# Patient Record
Sex: FEMALE | Race: WHITE | URBAN - METROPOLITAN AREA
[De-identification: names, ages, dates, MRNs, and addresses within clinical notes are randomized per-mention and may not be internally consistent; named-entity substitution may affect disease eponyms.]

---

## 2018-02-28 ENCOUNTER — OFFICE VISIT (OUTPATIENT)
Dept: FAMILY MEDICINE CLINIC | Age: 78
End: 2018-02-28

## 2018-02-28 VITALS
HEART RATE: 81 BPM | TEMPERATURE: 98.4 F | OXYGEN SATURATION: 95 % | HEIGHT: 63 IN | SYSTOLIC BLOOD PRESSURE: 122 MMHG | BODY MASS INDEX: 26.58 KG/M2 | WEIGHT: 150 LBS | DIASTOLIC BLOOD PRESSURE: 64 MMHG | RESPIRATION RATE: 20 BRPM

## 2018-02-28 DIAGNOSIS — Z00.00 MEDICARE ANNUAL WELLNESS VISIT, SUBSEQUENT: ICD-10-CM

## 2018-02-28 DIAGNOSIS — R73.03 PREDIABETES: ICD-10-CM

## 2018-02-28 DIAGNOSIS — F41.9 ANXIETY AND DEPRESSION: Primary | ICD-10-CM

## 2018-02-28 DIAGNOSIS — F32.A ANXIETY AND DEPRESSION: Primary | ICD-10-CM

## 2018-02-28 RX ORDER — VENLAFAXINE HYDROCHLORIDE 150 MG/1
150 CAPSULE, EXTENDED RELEASE ORAL DAILY
Qty: 30 CAP | Refills: 1 | Status: SHIPPED | OUTPATIENT
Start: 2018-02-28 | End: 2018-03-28 | Stop reason: SDUPTHER

## 2018-02-28 RX ORDER — BUPROPION HYDROCHLORIDE 150 MG/1
TABLET, EXTENDED RELEASE ORAL 2 TIMES DAILY
COMMUNITY
End: 2018-02-28 | Stop reason: ALTCHOICE

## 2018-02-28 RX ORDER — ASCORBIC ACID 500 MG
TABLET ORAL
COMMUNITY
End: 2018-05-30 | Stop reason: ALTCHOICE

## 2018-02-28 NOTE — PROGRESS NOTES
Assessment/Plan:    1. Anxiety and depression  -stop wellbutrin. incr dose effexor. F/u in 1mo. Consider changing xanax to buspar.  - venlafaxine-SR (EFFEXOR-XR) 150 mg capsule; Take 1 Cap by mouth daily. Indications: ANXIETY WITH DEPRESSION  Dispense: 30 Cap; Refill: 1    2. Medicare annual wellness visit, subsequent  - METABOLIC PANEL, COMPREHENSIVE; Future  - LIPID PANEL; Future  - CBC W/O DIFF; Future    3. Prediabetes  - HEMOGLOBIN A1C W/O EAG; Future    The plan was discussed with the patient. The patient verbalized understanding and is in agreement with the plan. All medication potential side effects were discussed with the patient. Health Maintenance:   Health Maintenance   Topic Date Due    Pneumococcal 65+ Low/Medium Risk (1 of 2 - PCV13) 09/18/2006    GLAUCOMA SCREENING Q2Y  01/01/2019    MEDICARE YEARLY EXAM  03/01/2019    DTaP/Tdap/Td series (2 - Td) 02/28/2028    OSTEOPOROSIS SCREENING (DEXA)  Addressed    ZOSTER VACCINE AGE 60>  Completed    Influenza Age 5 to Adult  Addressed       Julia Taylor is a 69 y.o. female and presents with Annual Wellness Visit     Subjective:  Hasn't been seen for 2 years b/c she was being seen by Dr. Yumiko [place]ana (but moved practice and is under investigation). Pt is going through a lot of stress right now. She is planning on moving to [place] soon. She feels anxious and down. PHQ 9 score indicates moderate depression, despite meds. She sleeps well on trazodone. No SI/HI. She needs xanax almost daily b/c of anxiety. ROS:  Constitutional: No recent weight change. No weakness/fatigue. No f/c. Skin: No rashes, change in nails/hair, itching   HENT: No HA, dizziness. No hearing loss/tinnitus. No nasal congestion/discharge. Eyes: No change in vision, double/blurred vision or eye pain/redness. Cardiovascular: No CP/palpitations. No CONN/orthopnea/PND. Respiratory: No cough/sputum, dyspnea, wheezing.    Gastointestinal: No dysphagia, reflux. No n/v. No constipation/diarrhea. No melena/rectal bleeding. Genitourinary: No dysuria, urinary hesitancy, nocturia, hematuria. No incontinence. Musculoskeletal: No joint pain/stiffness. No muscle pain/tenderness. Endo: No heat/cold intolerance, no polyuria/polydypsia. Heme: No h/o anemia. No easy bleeding/bruising. Allergy/Immunology: No seasonal rhinitis. Denies frequent colds, sinus/ear infections. Neurological: No seizures/numbness/weakness. No paresthesias. Psychiatric:  +depression, +anxiety. The problem list was updated as a part of today's visit. Patient Active Problem List   Diagnosis Code    Allergic rhinitis J30.9    Colon polyps K63.5    Panic disorder F41.0    Other and unspecified hyperlipidemia E78.5    Prediabetes R73.03    Neuropathy, leg G57.90    Fear of crowded places F40.00    Advance care planning Z71.89    DNR (do not resuscitate) Z66       The PSH, FH were reviewed. SH:  Social History   Substance Use Topics    Smoking status: Former Smoker     Quit date: 1/1/2007    Smokeless tobacco: Former User      Comment: 2007    Alcohol use 0.5 - 2.0 oz/week     1 - 4 Standard drinks or equivalent per week      Comment: 1-4/wk       Medications/Allergies:  Current Outpatient Prescriptions on File Prior to Visit   Medication Sig Dispense Refill    venlafaxine-SR (EFFEXOR-XR) 75 mg capsule Take 1 Cap by mouth daily. 90 Cap 0    montelukast (SINGULAIR) 10 mg tablet Take 1 Tab by mouth daily. Indications: ALLERGIC RHINITIS 30 Tab 1    vitamin e (E GEMS) 1,000 unit capsule Take 1,000 Units by mouth daily.  ALPRAZolam (XANAX) 0.25 mg tablet Take 1 Tab by mouth daily as needed for Anxiety. 30 Tab 1    traZODone (DESYREL) 50 mg tablet TAKE 1 TABLET NIGHTLY AS NEEDED FOR SLEEP 90 Tab 1    Cholecalciferol, Vitamin D3, 3,000 unit Tab Take  by mouth daily.  b complex vitamins tablet Take 1 Tab by mouth daily.         meloxicam (MOBIC) 15 mg tablet Take 1 Tab by mouth daily as needed for Pain. 30 Tab 0    CRESTOR 20 mg tablet TAKE 1 TABLET NIGHTLY 90 Tab 1     No current facility-administered medications on file prior to visit. No Known Allergies    Objective:  Visit Vitals    /64 (BP 1 Location: Left arm, BP Patient Position: Sitting)    Pulse 81    Temp 98.4 °F (36.9 °C) (Oral)    Resp 20    Ht 5' 3\" (1.6 m)    Wt 150 lb (68 kg)    SpO2 95%    BMI 26.57 kg/m2      Constitutional: Well developed, nourished, no distress, alert   HENT: Exterior ears and tympanic membranes normal bilaterally. Supple neck. No thyromegaly or lymphadenopathy. Oropharynx clear and moist mucous membranes. Eyes: Conjunctiva normal. PERRL. CV: S1, S2.  RRR. No murmurs/rubs. No thrills palpated. No carotid bruits. Intact distal pulses. No edema. Pulm: No abnormalities on inspection. Clear to auscultation bilaterally. No wheezing/rhonchi. Normal effort. GI: Soft, nontender, nondistended. Normal active bowel sounds. MS: Gait normal.  Joints without deformity/tenderness. Strength intact bilateral upper and lower ext. Normal ROM all extremities. Neuro: A/O x 3. No focal motor or sensory deficits. Speech normal.   Skin: No lesions/rashes on inspection. Psych: Appropriate affect, judgement and insight. Short-term memory intact.        PHQ over the last two weeks 2/28/2018   PHQ Not Done Active Diagnosis of Depression or Bipolar Disorder   Little interest or pleasure in doing things Nearly every day   Feeling down, depressed or hopeless Nearly every day   Total Score PHQ 2 6   Trouble falling or staying asleep, or sleeping too much Nearly every day   Feeling tired or having little energy Nearly every day   Poor appetite or overeating Several days   Feeling bad about yourself - or that you are a failure or have let yourself or your family down Not at all   Trouble concentrating on things such as school, work, reading or watching TV Not at all   Moving or speaking so slowly that other people could have noticed; or the opposite being so fidgety that others notice Not at all   Thoughts of being better off dead, or hurting yourself in some way Several days   PHQ 9 Score 14   How difficult have these problems made it for you to do your work, take care of your home and get along with others Somewhat difficult         This is a Subsequent Medicare Annual Wellness Exam (AWV) (Performed 12 months after IPPE or effective date of Medicare Part B enrollment)    I have reviewed the patient's medical history in detail and updated the computerized patient record. History     Past Medical History:   Diagnosis Date    Allergic rhinitis     Colon polyps     DJD (degenerative joint disease)     Hematuria     Other and unspecified hyperlipidemia     Panic disorder     Prediabetes       Past Surgical History:   Procedure Laterality Date    HX TUBAL LIGATION  1980     Current Outpatient Prescriptions   Medication Sig Dispense Refill    buPROPion SR (WELLBUTRIN SR) 150 mg SR tablet Take  by mouth two (2) times a day.  ascorbic acid, vitamin C, (VITAMIN C) 500 mg tablet Take  by mouth.  venlafaxine-SR (EFFEXOR-XR) 75 mg capsule Take 1 Cap by mouth daily. 90 Cap 0    montelukast (SINGULAIR) 10 mg tablet Take 1 Tab by mouth daily. Indications: ALLERGIC RHINITIS 30 Tab 1    vitamin e (E GEMS) 1,000 unit capsule Take 1,000 Units by mouth daily.  ALPRAZolam (XANAX) 0.25 mg tablet Take 1 Tab by mouth daily as needed for Anxiety. 30 Tab 1    traZODone (DESYREL) 50 mg tablet TAKE 1 TABLET NIGHTLY AS NEEDED FOR SLEEP 90 Tab 1    Cholecalciferol, Vitamin D3, 3,000 unit Tab Take  by mouth daily.  b complex vitamins tablet Take 1 Tab by mouth daily.  meloxicam (MOBIC) 15 mg tablet Take 1 Tab by mouth daily as needed for Pain.  30 Tab 0    CRESTOR 20 mg tablet TAKE 1 TABLET NIGHTLY 90 Tab 1     No Known Allergies  Family History Problem Relation Age of Onset    Cancer Mother      endometrial    Cancer Maternal Aunt      breast    Diabetes Paternal Uncle      Social History   Substance Use Topics    Smoking status: Former Smoker     Quit date: 1/1/2007    Smokeless tobacco: Former User      Comment: 2007    Alcohol use 0.5 - 2.0 oz/week     1 - 4 Standard drinks or equivalent per week      Comment: 1-4/wk     Patient Active Problem List   Diagnosis Code    Allergic rhinitis J30.9    Colon polyps K63.5    Panic disorder F41.0    Other and unspecified hyperlipidemia E78.5    Prediabetes R73.03    Neuropathy, leg G57.90    Fear of crowded places F40.00    Advance care planning Z71.89    DNR (do not resuscitate) Z66       Depression Risk Factor Screening:     PHQ over the last two weeks 2/28/2018   PHQ Not Done Active Diagnosis of Depression or Bipolar Disorder   Little interest or pleasure in doing things Nearly every day   Feeling down, depressed or hopeless Nearly every day   Total Score PHQ 2 6   Trouble falling or staying asleep, or sleeping too much Nearly every day   Feeling tired or having little energy Nearly every day   Poor appetite or overeating Several days   Feeling bad about yourself - or that you are a failure or have let yourself or your family down Not at all   Trouble concentrating on things such as school, work, reading or watching TV Not at all   Moving or speaking so slowly that other people could have noticed; or the opposite being so fidgety that others notice Not at all   Thoughts of being better off dead, or hurting yourself in some way Several days   PHQ 9 Score 14   How difficult have these problems made it for you to do your work, take care of your home and get along with others Somewhat difficult     Alcohol Risk Factor Screening: You do not drink alcohol or very rarely. Functional Ability and Level of Safety:   Hearing Loss  Hearing is good.     Activities of Daily Living  The home contains: no safety equipment. Patient does total self care    Fall Risk  Fall Risk Assessment, last 12 mths 2/28/2018   Able to walk? Yes   Fall in past 12 months? No       Abuse Screen  Patient is not abused    Cognitive Screening   Evaluation of Cognitive Function:  Has your family/caregiver stated any concerns about your memory: no  Normal, Mini Cog test    Patient Care Team   Patient Care Team:  Robina Agosto MD as PCP - General (Internal Medicine)    Assessment/Plan   Education and counseling provided:  Are appropriate based on today's review and evaluation  End-of-Life planning (with patient's consent)    Diagnoses and all orders for this visit:    1.  Medicare annual wellness visit, subsequent      Health Maintenance Due   Topic Date Due    Pneumococcal 65+ Low/Medium Risk (1 of 2 - PCV13) 09/18/2006

## 2018-02-28 NOTE — PROGRESS NOTES
Gwyn Antonio is a 69 y.o. female (: 1940) presenting to address:    Chief Complaint   Patient presents with    Annual Wellness Visit       Vitals:    18 1114   BP: 122/64   Pulse: 81   Resp: 20   Temp: 98.4 °F (36.9 °C)   TempSrc: Oral   SpO2: 95%   Weight: 150 lb (68 kg)   Height: 5' 3\" (1.6 m)   PainSc:   0 - No pain       Hearing/Vision:      Visual Acuity Screening    Right eye Left eye Both eyes   Without correction:      With correction: 20/25 20/25 20/20   Comments: Cataract      Learning Assessment:     Learning Assessment 3/19/2015   PRIMARY LEARNER Patient   HIGHEST LEVEL OF EDUCATION - PRIMARY LEARNER  4 YEARS OF COLLEGE   BARRIERS PRIMARY LEARNER NONE   CO-LEARNER CAREGIVER No   PRIMARY LANGUAGE ENGLISH    NEED No   LEARNER PREFERENCE PRIMARY DEMONSTRATION   ANSWERED BY Patient   RELATIONSHIP SELF     Depression Screening:     PHQ over the last two weeks 2018   PHQ Not Done Active Diagnosis of Depression or Bipolar Disorder   Little interest or pleasure in doing things -   Feeling down, depressed or hopeless -   Total Score PHQ 2 -     Fall Risk Assessment:     Fall Risk Assessment, last 12 mths 2018   Able to walk? Yes   Fall in past 12 months? No     Abuse Screening:     Abuse Screening Questionnaire 2018   Do you ever feel afraid of your partner? N   Are you in a relationship with someone who physically or mentally threatens you? N   Is it safe for you to go home? Y     Coordination of Care Questionaire:   1. Have you been to the ER, urgent care clinic since your last visit? Hospitalized since your last visit? NO    2. Have you seen or consulted any other health care providers outside of the [address] [place] [address] since your last visit? Include any pap smears or colon screening. YES. Previous PCP Dr. Kirkwood Schirmer. Internal Med. Advanced Directive:   1. Do you have an Advanced Directive? NO    2. Would you like information on Advanced Directives?  YES

## 2018-02-28 NOTE — ACP (ADVANCE CARE PLANNING)
Advance Care Planning (ACP) Provider Conversation Snapshot    Date of ACP Conversation: 02/28/18  Persons included in Conversation:  patient  Length of ACP Conversation in minutes:  <16 minutes (Non-Billable)    Authorized Decision Maker (if patient is incapable of making informed decisions): This person is:             For Patients with Decision Making Capacity:   pt is DNR. dDNR on file.   she doesn't wish for prolonged care past 2 weeks in setting of coma    Conversation Outcomes / Follow-Up Plan:   Completed new Advance Directive

## 2018-02-28 NOTE — PATIENT INSTRUCTIONS

## 2018-02-28 NOTE — MR AVS SNAPSHOT
[address] [address] [address]  [address] [facility] [id number] [phone] Patient: Jose E Linares MRN: [id number] GQE:1/05/1829 Visit Information Date & Time Provider Department Dept. Phone Encounter #  
 2/28/2018 11:15 AM Lona Davila, [address] [phone] [id number] Upcoming Health Maintenance Date Due Pneumococcal 65+ Low/Medium Risk (1 of 2 - PCV13) 9/18/2006 GLAUCOMA SCREENING Q2Y 1/1/2019 MEDICARE YEARLY EXAM 3/1/2019 DTaP/Tdap/Td series (2 - Td) 2/28/2028 Allergies as of 2/28/2018  Review Complete On: 2/28/2018 By: Siomara Decker No Known Allergies Current Immunizations  Never Reviewed No immunizations on file. Not reviewed this visit You Were Diagnosed With   
  
 Codes Comments Medicare annual wellness visit, subsequent    -  Primary ICD-10-CM: Z00.00 ICD-9-CM: V70.0 Dysthymia     ICD-10-CM: F34.1 ICD-9-CM: 300.4 Anxiety and depression     ICD-10-CM: F41.8 ICD-9-CM: 300.00, 311 Prediabetes     ICD-10-CM: R73.03 
ICD-9-CM: 790.29 Vitals BP Pulse Temp Resp Height(growth percentile) Weight(growth percentile) 122/64 (BP 1 Location: Left arm, BP Patient Position: Sitting) 81 98.4 °F (36.9 °C) (Oral) 20 5' 3\" (1.6 m) 150 lb (68 kg) SpO2 BMI OB Status Smoking Status 95% 26.57 kg/m2 Postmenopausal Former Smoker Vitals History BMI and BSA Data Body Mass Index Body Surface Area  
 26.57 kg/m 2 1.74 m 2 Preferred Pharmacy Pharmacy Name Phone [facility], [address] [place] [address] [phone] Your Updated Medication List  
  
   
This list is accurate as of 2/28/18 11:58 AM.  Always use your most recent med list.  
  
  
  
  
 ALPRAZolam 0.25 mg tablet Commonly known as:  Tyesha Pedraza Take 1 Tab by mouth daily as needed for Anxiety. b complex vitamins tablet Take 1 Tab by mouth daily. Cholecalciferol (Vitamin D3) 3,000 unit Tab Take  by mouth daily. montelukast 10 mg tablet Commonly known as:  SINGULAIR Take 1 Tab by mouth daily. Indications: ALLERGIC RHINITIS  
  
 traZODone 50 mg tablet Commonly known as:  DESYREL  
TAKE 1 TABLET NIGHTLY AS NEEDED FOR SLEEP  
  
 venlafaxine- mg capsule Commonly known as:  EFFEXOR-XR Take 1 Cap by mouth daily. Indications: ANXIETY WITH DEPRESSION  
  
 VITAMIN C 500 mg tablet Generic drug:  ascorbic acid (vitamin C) Take  by mouth.  
  
 vitamin e 1,000 unit capsule Commonly known as:  E GEMS Take 1,000 Units by mouth daily. Prescriptions Sent to Pharmacy Refills  
 venlafaxine-SR (EFFEXOR-XR) 150 mg capsule 1 Sig: Take 1 Cap by mouth daily. Indications: ANXIETY WITH DEPRESSION Class: Normal  
 Pharm[facility]: [facility] [address] [facility] #: [phone] Route: Oral  
  
Patient Instructions Medicare Wellness Visit, Female The best way to live healthy is to have a healthy lifestyle by eating a well-balanced diet, exercising regularly, limiting alcohol and stopping smoking. Regular physical exams and screening tests are another way to keep healthy. Preventive exams provided by your health care provider can find health problems before they become diseases or illnesses. Preventive services including immunizations, screening tests, monitoring and exams can help you take care of your own health. All people over age 72 should have a pneumovax  and and a prevnar shot to prevent pneumonia. These are once in a lifetime unless you and your provider decide differently. All people over 65 should have a yearly flu shot and a tetanus vaccine every 10 years. A bone mass density to screen for osteoporosis or thinning of the bones should be done every 2 years after 65. Screening for diabetes mellitus with a blood sugar test should be done every year. Glaucoma is a disease of the eye due to increased ocular pressure that can lead to blindness and it should be done every year by an eye professional. 
 
Cardiovascular screening tests that check for elevated lipids (fatty part of blood) which can lead to heart disease and strokes should be done every 5 years. Colorectal screening that evaluates for blood or polyps in your colon should be done yearly as a stool test or every five years as a flexible sigmoidoscope or every 10 years as a colonoscopy up to age 76. Breast cancer screening with a mammogram is recommended biennially  for women age 54-69. Screening for cervical cancer with a pap smear and pelvic exam is recommended for women after age 72 years every 2 years up to age 79 or when the provider and patient decide to stop. If there is a history of cervical abnormalities or other increased risk for cancer then the test is recommended yearly. Hepatitis C screening is also recommended for anyone born between 80 through [address]. A shingles vaccine is also recommended once in a lifetime after age 61. Your Medicare Wellness Exam is recommended annually. Here is a list of your current Health Maintenance items with a due date: 
Health Maintenance Due Topic Date Due  
 DTaP/Tdap/Td  (1 - Tdap) 09/18/1962  Pneumococcal Vaccine (1 of 2 - PCV13) 09/18/2006 Sujata Dickinson Annual Well Visit  02/10/2017  Flu Vaccine  08/01/2017  Glaucoma Screening   10/21/2017 [facility]! Dear Rebecca Pires: Thank you for requesting a [employer] account. Our records indicate that you already have an active [employer] account. You can access your account anytime at [email or web address] Did you know that you can access your hospital and ER discharge instructions at any time in [employer]?   You can also review all of your test results from your hospital stay or ER visit. Additional Information If you have questions, please visit the Frequently Asked Questions section of the [employer] website at [email or web address] Remember, [employer] is NOT to be used for urgent needs. For medical emergencies, dial 911. Now available from your iPhone and Android! Please provide this summary of care documentation to your next provider. Your primary care clinician is listed as Prashanth López. If you have any questions after today's visit, please call [phone].

## 2018-03-28 ENCOUNTER — OFFICE VISIT (OUTPATIENT)
Dept: FAMILY MEDICINE CLINIC | Age: 78
End: 2018-03-28

## 2018-03-28 VITALS
SYSTOLIC BLOOD PRESSURE: 120 MMHG | OXYGEN SATURATION: 97 % | HEART RATE: 100 BPM | DIASTOLIC BLOOD PRESSURE: 80 MMHG | WEIGHT: 149 LBS | RESPIRATION RATE: 20 BRPM | TEMPERATURE: 99.2 F | BODY MASS INDEX: 26.4 KG/M2 | HEIGHT: 63 IN

## 2018-03-28 DIAGNOSIS — F32.A ANXIETY AND DEPRESSION: Primary | ICD-10-CM

## 2018-03-28 DIAGNOSIS — F41.9 ANXIETY AND DEPRESSION: Primary | ICD-10-CM

## 2018-03-28 PROBLEM — F41.8 DEPRESSION WITH ANXIETY: Status: ACTIVE | Noted: 2018-03-28

## 2018-03-28 RX ORDER — VENLAFAXINE HYDROCHLORIDE 150 MG/1
150 CAPSULE, EXTENDED RELEASE ORAL DAILY
Qty: 90 CAP | Refills: 1 | Status: SHIPPED | OUTPATIENT
Start: 2018-03-28 | End: 2018-04-18 | Stop reason: ALTCHOICE

## 2018-03-28 NOTE — MR AVS SNAPSHOT
[address] [address] [place]  [address] [facility] [id number] [phone] Patient: Thedford Cowden MRN: [id number] Visit Information Date & Time Provider Department Dept. Phone Encounter #  
 3/28/2018  1:00 PM Rodney Garcia, [facility] [place] [phone] [id number] Upcoming Health Maintenance Date Due Pneumococcal 65+ Low/Medium Risk (1 of 2 - PCV13) 2006 GLAUCOMA SCREENING Q2Y 2019 MEDICARE YEARLY EXAM 3/1/2019 DTaP/Tdap/Td series (2 - Td) 2028 Allergies as of 3/28/2018  Review Complete On: 3/28/2018 By: Radha Ivory No Known Allergies Current Immunizations  Never Reviewed No immunizations on file. Not reviewed this visit You Were Diagnosed With   
  
 Codes Comments Anxiety and depression    -  Primary ICD-10-CM: F41.8 ICD-9-CM: 300.00, 311 Vitals BP Pulse Temp Resp Height(growth percentile) Weight(growth percentile) 120/80 (BP 1 Location: Left arm, BP Patient Position: Sitting) 100 99.2 °F (37.3 °C) (Oral) 20 5' 3\" (1.6 m) 149 lb (67.6 kg) SpO2 BMI OB Status Smoking Status 97% 26.39 kg/m2 Postmenopausal Former Smoker Vitals History BMI and BSA Data Body Mass Index Body Surface Area  
 26.39 kg/m 2 1.73 m 2 Preferred Pharmacy Pharmacy Name Phone [facility], [address] [place] [address] [phone] Your Updated Medication List  
  
   
This list is accurate as of 3/28/18  1:33 PM.  Always use your most recent med list.  
  
  
  
  
 ALPRAZolam 0.25 mg tablet Commonly known as:  Sangita Snuffer Take 1 Tab by mouth daily as needed for Anxiety. b complex vitamins tablet Take 1 Tab by mouth daily. Cholecalciferol (Vitamin D3) 3,000 unit Tab Take  by mouth daily. montelukast 10 mg tablet Commonly known as:  SINGULAIR  
 Take 1 Tab by mouth daily. Indications: ALLERGIC RHINITIS  
  
 traZODone 50 mg tablet Commonly known as:  DESYREL  
TAKE 1 TABLET NIGHTLY AS NEEDED FOR SLEEP  
  
 venlafaxine- mg capsule Commonly known as:  EFFEXOR-XR Take 1 Cap by mouth daily. Indications: ANXIETY WITH DEPRESSION  
  
 VITAMIN C 500 mg tablet Generic drug:  ascorbic acid (vitamin C) Take  by mouth.  
  
 vitamin e 1,000 unit capsule Commonly known as:  E GEMS Take 1,000 Units by mouth daily. Prescriptions Sent to Pharmacy Refills  
 venlafaxine-SR (EFFEXOR-XR) 150 mg capsule 1 Sig: Take 1 Cap by mouth daily. Indications: ANXIETY WITH DEPRESSION Class: Normal  
 Pharmacy: [facility] [address] [facility] #: [phone] Route: Oral  
  
Introducing [facility]! Dear Checo Sparrow: Thank you for requesting a [employer] account. Our records indicate that you already have an active [employer] account. You can access your account anytime at [email or web address] Did you know that you can access your hospital and ER discharge instructions at any time in [employer]? You can also review all of your test results from your hospital stay or ER visit. Additional Information If you have questions, please visit the Frequently Asked Questions section of the [employer] website at [email or web address] Remember, [employer] is NOT to be used for urgent needs. For medical emergencies, dial 911. Now available from your iPhone and Android! Please provide this summary of care documentation to your next provider. Your primary care clinician is listed as Prashanth López. If you have any questions after today's visit, please call [phone].

## 2018-03-28 NOTE — PROGRESS NOTES
Riley Matute is a 69 y.o. female (: 1940) presenting to address:    Chief Complaint   Patient presents with    Anxiety    Depression       Vitals:    18 1312   BP: 120/80   Pulse: 100   Resp: 20   Temp: 99.2 °F (37.3 °C)   TempSrc: Oral   SpO2: 97%   Weight: 149 lb (67.6 kg)   Height: 5' 3\" (1.6 m)   PainSc:   5   PainLoc: Generalized       Learning Assessment:     Learning Assessment 3/19/2015   PRIMARY LEARNER Patient   HIGHEST LEVEL OF EDUCATION - PRIMARY LEARNER  4 YEARS OF COLLEGE   BARRIERS PRIMARY LEARNER NONE   CO-LEARNER CAREGIVER No   PRIMARY LANGUAGE ENGLISH    NEED No   LEARNER PREFERENCE PRIMARY DEMONSTRATION   ANSWERED BY Patient   RELATIONSHIP SELF     Depression Screening:     PHQ over the last two weeks 2018   PHQ Not Done Active Diagnosis of Depression or Bipolar Disorder   Little interest or pleasure in doing things Nearly every day   Feeling down, depressed or hopeless Nearly every day   Total Score PHQ 2 6   Trouble falling or staying asleep, or sleeping too much Nearly every day   Feeling tired or having little energy Nearly every day   Poor appetite or overeating Several days   Feeling bad about yourself - or that you are a failure or have let yourself or your family down Not at all   Trouble concentrating on things such as school, work, reading or watching TV Not at all   Moving or speaking so slowly that other people could have noticed; or the opposite being so fidgety that others notice Not at all   Thoughts of being better off dead, or hurting yourself in some way Several days   PHQ 9 Score 14   How difficult have these problems made it for you to do your work, take care of your home and get along with others Somewhat difficult     Fall Risk Assessment:     Fall Risk Assessment, last 12 mths 2018   Able to walk? Yes   Fall in past 12 months? No     Abuse Screening:     Abuse Screening Questionnaire 2018   Do you ever feel afraid of your partner?  Ivory Lorenzo Are you in a relationship with someone who physically or mentally threatens you? N   Is it safe for you to go home? Y     Coordination of Care Questionaire:   1. Have you been to the ER, urgent care clinic since your last visit? Hospitalized since your last visit? NO    2. Have you seen or consulted any other health care providers outside of the [address] [place] [address] since your last visit? Include any pap smears or colon screening. NO    Advanced Directive:   1. Do you have an Advanced Directive? YES    2. Would you like information on Advanced Directives?  NO

## 2018-03-28 NOTE — PROGRESS NOTES
Assessment/Plan:    1. Anxiety and depression  -cont current regimen. - venlafaxine-SR (EFFEXOR-XR) 150 mg capsule; Take 1 Cap by mouth daily. Indications: ANXIETY WITH DEPRESSION  Dispense: 90 Cap; Refill: 1    The plan was discussed with the patient. The patient verbalized understanding and is in agreement with the plan. All medication potential side effects were discussed with the patient. Health Maintenance:   Health Maintenance   Topic Date Due    Pneumococcal 65+ Low/Medium Risk (1 of 2 - PCV13) 09/18/2006    GLAUCOMA SCREENING Q2Y  01/01/2019    MEDICARE YEARLY EXAM  03/01/2019    DTaP/Tdap/Td series (2 - Td) 02/28/2028    Bone Densitometry (Dexa) Screening  Addressed    ZOSTER VACCINE AGE 60>  Completed    Influenza Age 5 to Adult  Addressed     Julia Taylor is a 69 y.o. female and presents with Anxiety and Depression     Subjective: Anxiety and depression - dose effexor incr and wellbutrin stopped at last visit. She notes she's stressed from trying to [facility]. She feels overwhelmed. ROS:  Constitutional: No recent weight change. No weakness/fatigue. No f/c. Cardiovascular: No CP/palpitations. No CONN/orthopnea/PND. Respiratory: No cough/sputum, dyspnea, wheezing. Gastointestinal: No dysphagia, reflux. No n/v. No constipation/diarrhea. No melena/rectal bleeding. Neurological: No seizures/numbness/weakness. No paresthesias. Psychiatric:  + depression, +anxiety. The problem list was updated as a part of today's visit. Patient Active Problem List   Diagnosis Code    Allergic rhinitis J30.9    Colon polyps K63.5    Panic disorder F41.0    Other and unspecified hyperlipidemia E78.5    Prediabetes R73.03    Neuropathy, leg G57.90    Fear of crowded places F40.00    DNR (do not resuscitate) Z66       The PSH, FH were reviewed.     SH:  Social History   Substance Use Topics    Smoking status: Former Smoker     Quit date: 1/1/2007    Smokeless tobacco: Former User Comment: 2007    Alcohol use 0.5 - 2.0 oz/week     1 - 4 Standard drinks or equivalent per week      Comment: 1-4/wk       Medications/Allergies:  Current Outpatient Prescriptions on File Prior to Visit   Medication Sig Dispense Refill    ascorbic acid, vitamin C, (VITAMIN C) 500 mg tablet Take  by mouth.  venlafaxine-SR (EFFEXOR-XR) 150 mg capsule Take 1 Cap by mouth daily. Indications: ANXIETY WITH DEPRESSION 30 Cap 1    montelukast (SINGULAIR) 10 mg tablet Take 1 Tab by mouth daily. Indications: ALLERGIC RHINITIS 30 Tab 1    vitamin e (E GEMS) 1,000 unit capsule Take 1,000 Units by mouth daily.  ALPRAZolam (XANAX) 0.25 mg tablet Take 1 Tab by mouth daily as needed for Anxiety. 30 Tab 1    traZODone (DESYREL) 50 mg tablet TAKE 1 TABLET NIGHTLY AS NEEDED FOR SLEEP 90 Tab 1    Cholecalciferol, Vitamin D3, 3,000 unit Tab Take  by mouth daily.  b complex vitamins tablet Take 1 Tab by mouth daily. No current facility-administered medications on file prior to visit. No Known Allergies    Objective:  Visit Vitals    /80 (BP 1 Location: Left arm, BP Patient Position: Sitting)    Pulse 100    Temp 99.2 °F (37.3 °C) (Oral)    Resp 20    Ht 5' 3\" (1.6 m)    Wt 149 lb (67.6 kg)    SpO2 97%    BMI 26.39 kg/m2      Constitutional: Well developed, nourished, no distress, alert   CV: S1, S2.  RRR. No murmurs/rubs. No thrills palpated. No carotid bruits. Intact distal pulses. No edema. Pulm: No abnormalities on inspection. Clear to auscultation bilaterally. No wheezing/rhonchi. Normal effort. Psych: Appropriate affect, judgement and insight. Short-term memory intact.

## 2018-04-10 ENCOUNTER — TELEPHONE (OUTPATIENT)
Dept: FAMILY MEDICINE CLINIC | Age: 78
End: 2018-04-10

## 2018-04-10 RX ORDER — TRAZODONE HYDROCHLORIDE 50 MG/1
TABLET ORAL
Qty: 90 TAB | Refills: 1 | Status: SHIPPED | OUTPATIENT
Start: 2018-04-10 | End: 2018-04-18 | Stop reason: SDUPTHER

## 2018-04-10 NOTE — TELEPHONE ENCOUNTER
Patient showed up 15 mins late for her appointment. She was marked as a no-show. I r/s the patient for Monday, April 16, 2018 02:00 PM. She would like to see if there is a chance that she could be seen earlier as she only has one pill left. Please advise.

## 2018-04-10 NOTE — TELEPHONE ENCOUNTER
Called pt for detail of what she is out of. Pt states she is out of effexor. She had not picked up the 3/28/18 Rx. She will pick that up. She states that she also wants her trazodone, that she only has one pill. This has not been filled since 2015. I asked if someone else had filled this for her. She sighed, states \" I'm 78 (she is 77) and I'm senile, I don't know\". I told her we would need to see her before that would be refilled. She said 'that's fine, I don't need it\". Appt is now Monday, April 16.

## 2018-04-18 ENCOUNTER — OFFICE VISIT (OUTPATIENT)
Dept: FAMILY MEDICINE CLINIC | Age: 78
End: 2018-04-18

## 2018-04-18 VITALS
HEART RATE: 94 BPM | OXYGEN SATURATION: 96 % | SYSTOLIC BLOOD PRESSURE: 130 MMHG | TEMPERATURE: 98.2 F | HEIGHT: 63 IN | WEIGHT: 147 LBS | DIASTOLIC BLOOD PRESSURE: 80 MMHG | BODY MASS INDEX: 26.05 KG/M2 | RESPIRATION RATE: 20 BRPM

## 2018-04-18 DIAGNOSIS — F32.1 CURRENT MODERATE EPISODE OF MAJOR DEPRESSIVE DISORDER WITHOUT PRIOR EPISODE (HCC): Primary | ICD-10-CM

## 2018-04-18 DIAGNOSIS — F51.04 PSYCHOPHYSIOLOGICAL INSOMNIA: ICD-10-CM

## 2018-04-18 DIAGNOSIS — Z65.9 OTHER SOCIAL STRESSOR: ICD-10-CM

## 2018-04-18 DIAGNOSIS — J01.00 ACUTE NON-RECURRENT MAXILLARY SINUSITIS: ICD-10-CM

## 2018-04-18 RX ORDER — TRAZODONE HYDROCHLORIDE 100 MG/1
TABLET ORAL
Qty: 90 TAB | Refills: 0 | Status: SHIPPED | OUTPATIENT
Start: 2018-04-18 | End: 2018-04-18 | Stop reason: SDUPTHER

## 2018-04-18 RX ORDER — FLUTICASONE PROPIONATE 50 MCG
2 SPRAY, SUSPENSION (ML) NASAL DAILY
COMMUNITY

## 2018-04-18 RX ORDER — TRAZODONE HYDROCHLORIDE 100 MG/1
50 TABLET ORAL
Qty: 90 TAB | Refills: 0
Start: 2018-04-18

## 2018-04-18 RX ORDER — FLUOXETINE HYDROCHLORIDE 20 MG/1
20 CAPSULE ORAL DAILY
Qty: 90 CAP | Refills: 0 | Status: SHIPPED | OUTPATIENT
Start: 2018-04-18 | End: 2018-07-26 | Stop reason: SDUPTHER

## 2018-04-18 RX ORDER — AMOXICILLIN AND CLAVULANATE POTASSIUM 500; 125 MG/1; MG/1
1 TABLET, FILM COATED ORAL 2 TIMES DAILY
Qty: 14 TAB | Refills: 0 | Status: SHIPPED | OUTPATIENT
Start: 2018-04-18 | End: 2018-04-25

## 2018-04-18 NOTE — PROGRESS NOTES
Assessment/Plan:    1. Current moderate episode of major depressive disorder without prior episode ([facility] [address]  -start prozac. Has been off effexor x 1 week  - FLUoxetine (PROZAC) 20 mg capsule; Take 1 Cap by mouth daily. Dispense: 90 Cap; Refill: 0    2. Psychophysiological insomnia  -pt to take 1/2 tab 100mg qhs.  - traZODone (DESYREL) 100 mg tablet; TAKE 1 TABLET NIGHTLY AS NEEDED FOR SLEEP  Dispense: 90 Tab; Refill: 0    3. Other social stressor    4. Acute non-recurrent maxillary sinusitis  - amoxicillin-clavulanate (AUGMENTIN) 500-125 mg per tablet; Take 1 Tab by mouth two (2) times a day for 7 days. Dispense: 14 Tab; Refill: 0    The plan was discussed with the patient. The patient verbalized understanding and is in agreement with the plan. All medication potential side effects were discussed with the patient. Health Maintenance:   Health Maintenance   Topic Date Due    Pneumococcal 65+ Low/Medium Risk (1 of 2 - PCV13) 09/18/2006    GLAUCOMA SCREENING Q2Y  01/01/2019    MEDICARE YEARLY EXAM  03/01/2019    DTaP/Tdap/Td series (2 - Td) 02/28/2028    Bone Densitometry (Dexa) Screening  Addressed    ZOSTER VACCINE AGE 60>  Completed    Influenza Age 5 to Adult  Addressed       Gypsy Cunningham is a 69 y.o. female and presents with Medication Problem and Sinus Infection     Subjective: Anxiety and depression - on effexor (ran out a week ago). She doesn't think it's helping. She states she's just stressed with down-sizing and selling the house. She states she feels overwhelmed. She is getting confused more often. She has difficulty remembering things x a few months. She states \"i'm losing it\". Has done well on prozac previously. MOCA testing is nml 26/30. PHQ 9 score indicates moderate depression. Pt c/o nasal congestion. Has been using flonase and zyrtec x 1 week. No f/c or cough. +HA, eye pressure and maxillary pain. ROS:  Constitutional: No recent weight change. No weakness/fatigue.   No f/c.   HENT: + HA, no dizziness. No hearing loss/tinnitus.  + nasal congestion/discharge. Eyes: No change in vision, double/blurred vision or eye pain/redness. Cardiovascular: No CP/palpitations. No CONN/orthopnea/PND. Respiratory: No cough/sputum, dyspnea, wheezing. Psychiatric:  + depression, +anxiety. The problem list was updated as a part of today's visit. Patient Active Problem List   Diagnosis Code    Allergic rhinitis J30.9    Colon polyps K63.5    Panic disorder F41.0    Other and unspecified hyperlipidemia E78.5    Prediabetes R73.03    Neuropathy, leg G57.90    Fear of crowded places F40.00    DNR (do not resuscitate) Z66    Depression with anxiety F41.8    Anxiety and depression F41.9, F32.9       The PSH, FH were reviewed. SH:  Social History   Substance Use Topics    Smoking status: Former Smoker     Quit date: 1/1/2007    Smokeless tobacco: Former User      Comment: 2007    Alcohol use 0.5 - 2.0 oz/week     1 - 4 Standard drinks or equivalent per week      Comment: 1-4/wk       Medications/Allergies:  Current Outpatient Prescriptions on File Prior to Visit   Medication Sig Dispense Refill    venlafaxine-SR (EFFEXOR-XR) 150 mg capsule Take 1 Cap by mouth daily. Indications: ANXIETY WITH DEPRESSION 90 Cap 1    ascorbic acid, vitamin C, (VITAMIN C) 500 mg tablet Take  by mouth.  vitamin e (E GEMS) 1,000 unit capsule Take 1,000 Units by mouth daily.  Cholecalciferol, Vitamin D3, 3,000 unit Tab Take  by mouth daily.  b complex vitamins tablet Take 1 Tab by mouth daily.  traZODone (DESYREL) 50 mg tablet TAKE 1 TABLET NIGHTLY AS NEEDED FOR SLEEP 90 Tab 1    montelukast (SINGULAIR) 10 mg tablet Take 1 Tab by mouth daily. Indications: ALLERGIC RHINITIS 30 Tab 1    ALPRAZolam (XANAX) 0.25 mg tablet Take 1 Tab by mouth daily as needed for Anxiety. 30 Tab 1     No current facility-administered medications on file prior to visit.          No Known Allergies    Objective:  Visit Vitals    /80 (BP 1 Location: Left arm, BP Patient Position: Sitting)    Pulse 94    Temp 98.2 °F (36.8 °C) (Oral)    Resp 20    Ht 5' 3\" (1.6 m)    Wt 147 lb (66.7 kg)    SpO2 96%    BMI 26.04 kg/m2      Constitutional: Well developed, nourished, no distress, alert   HENT: Exterior ears and tympanic membranes normal bilaterally. Supple neck. No thyromegaly or lymphadenopathy. Oropharynx clear and moist mucous membranes. Eyes: Conjunctiva normal. PERRL. CV: S1, S2.  RRR. No murmurs/rubs. No thrills palpated. No carotid bruits. Intact distal pulses. No edema. Pulm: No abnormalities on inspection. Clear to auscultation bilaterally. No wheezing/rhonchi. Normal effort. Psych: Appropriate affect, judgement and insight. Short-term memory intact.        PHQ over the last two weeks 4/18/2018   PHQ Not Done -   Little interest or pleasure in doing things Nearly every day   Feeling down, depressed or hopeless Nearly every day   Total Score PHQ 2 6   Trouble falling or staying asleep, or sleeping too much Nearly every day   Feeling tired or having little energy Nearly every day   Poor appetite or overeating Nearly every day   Feeling bad about yourself - or that you are a failure or have let yourself or your family down Not at all   Trouble concentrating on things such as school, work, reading or watching TV Nearly every day   Moving or speaking so slowly that other people could have noticed; or the opposite being so fidgety that others notice Not at all   Thoughts of being better off dead, or hurting yourself in some way Not at all   PHQ 9 Score 18   How difficult have these problems made it for you to do your work, take care of your home and get along with others -

## 2018-04-18 NOTE — PROGRESS NOTES
Jonathon Chavez is a 69 y.o. female (: 1940) presenting to address:    Chief Complaint   Patient presents with    Medication Problem    Sinus Infection       Vitals:    18 0951   BP: 130/80   Pulse: 94   Resp: 20   Temp: 98.2 °F (36.8 °C)   TempSrc: Oral   SpO2: 96%   Weight: 147 lb (66.7 kg)   Height: 5' 3\" (1.6 m)   PainSc:   5   PainLoc: Face       Learning Assessment:     Learning Assessment 3/19/2015   PRIMARY LEARNER Patient   HIGHEST LEVEL OF EDUCATION - PRIMARY LEARNER  4 YEARS OF COLLEGE   BARRIERS PRIMARY LEARNER NONE   CO-LEARNER CAREGIVER No   PRIMARY LANGUAGE ENGLISH    NEED No   LEARNER PREFERENCE PRIMARY DEMONSTRATION   ANSWERED BY Patient   RELATIONSHIP SELF     Depression Screening:     PHQ over the last two weeks 2018   PHQ Not Done Active Diagnosis of Depression or Bipolar Disorder   Little interest or pleasure in doing things Nearly every day   Feeling down, depressed or hopeless Nearly every day   Total Score PHQ 2 6   Trouble falling or staying asleep, or sleeping too much Nearly every day   Feeling tired or having little energy Nearly every day   Poor appetite or overeating Several days   Feeling bad about yourself - or that you are a failure or have let yourself or your family down Not at all   Trouble concentrating on things such as school, work, reading or watching TV Not at all   Moving or speaking so slowly that other people could have noticed; or the opposite being so fidgety that others notice Not at all   Thoughts of being better off dead, or hurting yourself in some way Several days   PHQ 9 Score 14   How difficult have these problems made it for you to do your work, take care of your home and get along with others Somewhat difficult     Fall Risk Assessment:     Fall Risk Assessment, last 12 mths 2018   Able to walk? Yes   Fall in past 12 months?  No     Abuse Screening:     Abuse Screening Questionnaire 2018   Do you ever feel afraid of your partner? N   Are you in a relationship with someone who physically or mentally threatens you? N   Is it safe for you to go home? Y     Coordination of Care Questionaire:   1. Have you been to the ER, urgent care clinic since your last visit? Hospitalized since your last visit? NO    2. Have you seen or consulted any other health care providers outside of the [address] [place] [address] since your last visit? Include any pap smears or colon screening. NO    Advanced Directive:   1. Do you have an Advanced Directive? YES    2. Would you like information on Advanced Directives?  NO

## 2018-04-18 NOTE — PATIENT INSTRUCTIONS
Recovering From Depression: Care Instructions  Your Care Instructions    Taking good care of yourself is important as you recover from depression. In time, your symptoms will fade as your treatment takes hold. Do not give up. Instead, focus your energy on getting better. Your mood will improve. It just takes some time. Focus on things that can help you feel better, such as being with friends and family, eating well, and getting enough rest. But take things slowly. Do not do too much too soon. You will begin to feel better gradually. Follow-up care is a key part of your treatment and safety. Be sure to make and go to all appointments, and call your doctor if you are having problems. It's also a good idea to know your test results and keep a list of the medicines you take. How can you care for yourself at home? Be realistic  · If you have a large task to do, break it up into smaller steps you can handle, and just do what you can. · You may want to put off important decisions until your depression has lifted. If you have plans that will have a major impact on your life, such as marriage, divorce, or a job change, try to wait a bit. Talk it over with friends and loved ones who can help you look at the overall picture first.  · Reaching out to people for help is important. Do not isolate yourself. Let your family and friends help you. Find someone you can trust and confide in, and talk to that person. · Be patient, and be kind to yourself. Remember that depression is not your fault and is not something you can overcome with willpower alone. Treatment is necessary for depression, just like for any other illness. Feeling better takes time, and your mood will improve little by little. Stay active  · Stay busy and get outside. Take a walk, or try some other light exercise. · Talk with your doctor about an exercise program. Exercise can help with mild depression. · Go to a movie or concert.  Take part in a Jewish activity or other social gathering. Go to a [employer] game. · Ask a friend to have dinner with you. Take care of yourself  · Eat a balanced diet with plenty of fresh fruits and vegetables, whole grains, and lean protein. If you have lost your appetite, eat small snacks rather than large meals. · Avoid drinking alcohol or using illegal drugs. Do not take medicines that have not been prescribed for you. They may interfere with medicines you may be taking for depression, or they may make your depression worse. · Take your medicines exactly as they are prescribed. You may start to feel better within 1 to 3 weeks of taking antidepressant medicine. But it can take as many as 6 to 8 weeks to see more improvement. If you have questions or concerns about your medicines, or if you do not notice any improvement by 3 weeks, talk to your doctor. · If you have any side effects from your medicine, tell your doctor. Antidepressants can make you feel tired, dizzy, or nervous. Some people have dry mouth, constipation, headaches, sexual problems, or diarrhea. Many of these side effects are mild and will go away on their own after you have been taking the medicine for a few weeks. Some may last longer. Talk to your doctor if side effects are bothering you too much. You might be able to try a different medicine. · Get enough sleep. If you have problems sleeping:  ¨ Go to bed at the same time every night, and get up at the same time every morning. ¨ Keep your bedroom dark and quiet. ¨ Do not exercise after 5:00 p.m. ¨ Avoid drinks with caffeine after 5:00 p.m. · Avoid sleeping pills unless they are prescribed by the doctor treating your depression. Sleeping pills may make you groggy during the day, and they may interact with other medicine you are taking. · If you have any other illnesses, such as diabetes, heart disease, or high blood pressure, make sure to continue with your treatment.  Tell your doctor about all of the medicines you take, including those with or without a prescription. · Keep the numbers for these national suicide hotlines: 0-530-769-TALK (4-392.964.8395) and 2-413-WGIMAAN (7-481.251.7432). If you or someone you know talks about suicide or feeling hopeless, get help right away. When should you call for help? Call 911 anytime you think you may need emergency care. For example, call if:  ? · You feel like hurting yourself or someone else. ? · Someone you know has depression and is about to attempt or is attempting suicide. ?Call your doctor now or seek immediate medical care if:  ? · You hear voices. ? · Someone you know has depression and:  ¨ Starts to give away his or her possessions. ¨ Uses illegal drugs or drinks alcohol heavily. ¨ Talks or writes about death, including writing suicide notes or talking about guns, knives, or pills. ¨ Starts to spend a lot of time alone. ¨ Acts very aggressively or suddenly appears calm. ? Watch closely for changes in your health, and be sure to contact your doctor if:  ? · You do not get better as expected. Where can you learn more? Go to http://darron-ck.info/. Enter P942 in the search box to learn more about \"Recovering From Depression: Care Instructions. \"  Current as of: May 12, 2017  Content Version: 11.4  © 1402-5216 [employer]. Care instructions adapted under license by [employer] (which disclaims liability or warranty for this information). If you have questions about a medical condition or this instruction, always ask your healthcare professional. [address] any warranty or liability for your use of this information.

## 2018-04-18 NOTE — MR AVS SNAPSHOT
[address] [place] [address] [address] [facility] [address] [phone] Patient: Claudine Olszewski MRN: [id number] Visit Information Date & Time Provider Department Dept. Phone Encounter #  
 4/18/2018  9:45 AM Jannetta Felty, [employer] [phone] [id number] Follow-up Instructions Return in about 1 month (around 5/18/2018). Upcoming Health Maintenance Date Due Pneumococcal 65+ Low/Medium Risk (1 of 2 - PCV13) 9/18/2006 GLAUCOMA SCREENING Q2Y 1/1/2019 MEDICARE YEARLY EXAM 3/1/2019 DTaP/Tdap/Td series (2 - Td) 2/28/2028 Allergies as of 4/18/2018  Review Complete On: 4/18/2018 By: Jannetta Felty, MD  
 No Known Allergies Current Immunizations  Never Reviewed No immunizations on file. Not reviewed this visit You Were Diagnosed With   
  
 Codes Comments Current moderate episode of major depressive disorder without prior episode [facility]    -  Primary ICD-10-CM: F32.1 ICD-9-CM: 296.22 Psychophysiological insomnia     ICD-10-CM: F51.04 
ICD-9-CM: 307.42 Other social stressor     ICD-10-CM: Z65.10 ICD-9-CM: V62.9 Acute non-recurrent maxillary sinusitis     ICD-10-CM: J01.00 ICD-9-CM: 461.0 Vitals BP Pulse Temp Resp Height(growth percentile) Weight(growth percentile) 130/80 (BP 1 Location: Left arm, BP Patient Position: Sitting) 94 98.2 °F (36.8 °C) (Oral) 20 5' 3\" (1.6 m) 147 lb (66.7 kg) SpO2 BMI OB Status Smoking Status 96% 26.04 kg/m2 Postmenopausal Former Smoker Vitals History BMI and BSA Data Body Mass Index Body Surface Area 26.04 kg/m 2 1.72 m 2 Preferred Pharmacy Pharmacy Name Phone [facility] [address], [address] [address] [facility] [phone] Your Updated Medication List  
  
   
This list is accurate as of 4/18/18 10:25 AM.  Always use your most recent med list.  
  
  
  
  
 amoxicillin-clavulanate 500-125 mg per tablet Commonly known as:  AUGMENTIN Take 1 Tab by mouth two (2) times a day for 7 days. b complex vitamins tablet Take 1 Tab by mouth daily. Cholecalciferol (Vitamin D3) 3,000 unit Tab Take  by mouth daily. FLONASE ALLERGY RELIEF 50 mcg/actuation nasal spray Generic drug:  fluticasone 2 Sprays by Both Nostrils route daily. FLUoxetine 20 mg capsule Commonly known as:  PROzac Take 1 Cap by mouth daily. traZODone 100 mg tablet Commonly known as:  DESYREL  
TAKE 1 TABLET NIGHTLY AS NEEDED FOR SLEEP  
  
 VITAMIN C 500 mg tablet Generic drug:  ascorbic acid (vitamin C) Take  by mouth.  
  
 vitamin e 1,000 unit capsule Commonly known as:  E GEMS Take 1,000 Units by mouth daily. ZyrTEC 10 mg Cap Generic drug:  Cetirizine Take 10 mg by mouth daily. Prescriptions Sent to Pharmacy Refills FLUoxetine (PROZAC) 20 mg capsule 0 Sig: Take 1 Cap by mouth daily. Class: Normal  
 Pharmacy: [address] Ph #: [phone] Route: Oral  
 traZODone (DESYREL) 100 mg tablet 0 Sig: TAKE 1 TABLET NIGHTLY AS NEEDED FOR SLEEP Class: Normal  
 Pharmacy: [address] Ph #: [phone]  
 amoxicillin-clavulanate (AUGMENTIN) 500-125 mg per tablet 0 Sig: Take 1 Tab by mouth two (2) times a day for 7 days. Class: Normal  
 Pharmacy: [address] Ph #: [phone] Route: Oral  
  
Follow-up Instructions Return in about 1 month (around 5/18/2018). Patient Instructions Recovering From Depression: Care Instructions Your Care Instructions Taking good care of yourself is important as you recover from depression. In time, your symptoms will fade as your treatment takes hold. Do not give up. Instead, focus your energy on getting better. Your mood will improve. It just takes some time. Focus on things that can help you feel better, such as being with friends and family, eating well, and getting enough rest. But take things slowly. Do not do too much too soon. You will begin to feel better gradually. Follow-up care is a key part of your treatment and safety. Be sure to make and go to all appointments, and call your doctor if you are having problems. It's also a good idea to know your test results and keep a list of the medicines you take. How can you care for yourself at home? Be realistic · If you have a large task to do, break it up into smaller steps you can handle, and just do what you can. · You may want to put off important decisions until your depression has lifted. If you have plans that will have a major impact on your life, such as marriage, divorce, or a job change, try to wait a bit. Talk it over with friends and loved ones who can help you look at the overall picture first. 
· Reaching out to people for help is important. Do not isolate yourself. Let your family and friends help you. Find someone you can trust and confide in, and talk to that person. · Be patient, and be kind to yourself. Remember that depression is not your fault and is not something you can overcome with willpower alone. Treatment is necessary for depression, just like for any other illness. Feeling better takes time, and your mood will improve little by little. Stay active · Stay busy and get outside. Take a walk, or try some other light exercise. · Talk with your doctor about an exercise program. Exercise can help with mild depression. · Go to a movie or concert. Take part in a Confucianism activity or other social gathering. Go to a ball game. · Ask a friend to have dinner with you. Take care of yourself · Eat a balanced diet with plenty of fresh fruits and vegetables, whole grains, and lean protein.  If you have lost your appetite, eat small snacks rather than large meals. · Avoid drinking alcohol or using illegal drugs. Do not take medicines that have not been prescribed for you. They may interfere with medicines you may be taking for depression, or they may make your depression worse. · Take your medicines exactly as they are prescribed. You may start to feel better within 1 to 3 weeks of taking antidepressant medicine. But it can take as many as 6 to 8 weeks to see more improvement. If you have questions or concerns about your medicines, or if you do not notice any improvement by 3 weeks, talk to your doctor. · If you have any side effects from your medicine, tell your doctor. Antidepressants can make you feel tired, dizzy, or nervous. Some people have dry mouth, constipation, headaches, sexual problems, or diarrhea. Many of these side effects are mild and will go away on their own after you have been taking the medicine for a few weeks. Some may last longer. Talk to your doctor if side effects are bothering you too much. You might be able to try a different medicine. · Get enough sleep. If you have problems sleeping: ¨ Go to bed at the same time every night, and get up at the same time every morning. ¨ Keep your bedroom dark and quiet. ¨ Do not exercise after 5:00 p.m. ¨ Avoid drinks with caffeine after 5:00 p.m. · Avoid sleeping pills unless they are prescribed by the doctor treating your depression. Sleeping pills may make you groggy during the day, and they may interact with other medicine you are taking. · If you have any other illnesses, such as diabetes, heart disease, or high blood pressure, make sure to continue with your treatment. Tell your doctor about all of the medicines you take, including those with or without a prescription. · Keep the numbers for these national suicide hotlines: 2-668-569-TALK (3-733-353-825.841.3786) and 3-714-MSYVXCO (8-466.145.4674).  If you or someone you know talks about suicide or feeling hopeless, get help right away. When should you call for help? Call 911 anytime you think you may need emergency care. For example, call if: 
? · You feel like hurting yourself or someone else. ? · Someone you know has depression and is about to attempt or is attempting suicide. ?Call your doctor now or seek immediate medical care if: 
? · You hear voices. ? · Someone you know has depression and: 
¨ Starts to give away his or her possessions. ¨ Uses illegal drugs or drinks alcohol heavily. ¨ Talks or writes about death, including writing suicide notes or talking about guns, knives, or pills. ¨ Starts to spend a lot of time alone. ¨ Acts very aggressively or suddenly appears calm. ? Watch closely for changes in your health, and be sure to contact your doctor if: 
? · You do not get better as expected. Where can you learn more? Go to http://darron-ck.info/. Enter C177 in the search box to learn more about \"Recovering From Depression: Care Instructions. \" Current as of: May 12, 2017 Content Version: 11.4 © 4813-2045 [employer]. Care instructions adapted under license by [employer] (which disclaims liability or warranty for this information). If you have questions about a medical condition or this instruction, always ask your healthcare professional. [address] any warranty or liability for your use of this information. Introducing [facility]! Dear Holly Rear: Thank you for requesting a [employer] account. Our records indicate that you have previously registered for a [employer] account but its currently inactive. Please call our [employer] support line at [phone]. Additional Information If you have questions, please visit the Frequently Asked Questions section of the [email or web address] website at [email or web address] Remember, ADTZhart is NOT to be used for urgent needs. For medical emergencies, dial 911. Now available from your iPhone and Android! Please provide this summary of care documentation to your next provider. Your primary care clinician is listed as Prashanth López. If you have any questions after today's visit, please call [phone].

## 2018-05-02 ENCOUNTER — TELEPHONE (OUTPATIENT)
Dept: FAMILY MEDICINE CLINIC | Age: 78
End: 2018-05-02

## 2018-05-02 DIAGNOSIS — J30.2 OTHER SEASONAL ALLERGIC RHINITIS: ICD-10-CM

## 2018-05-02 RX ORDER — MONTELUKAST SODIUM 10 MG/1
10 TABLET ORAL DAILY
Qty: 90 TAB | Refills: 1 | Status: SHIPPED | OUTPATIENT
Start: 2018-05-02

## 2018-05-07 ENCOUNTER — DOCUMENTATION ONLY (OUTPATIENT)
Dept: FAMILY MEDICINE CLINIC | Age: 78
End: 2018-05-07

## 2018-05-07 NOTE — PROGRESS NOTES
Patient  called with concerns about her medication she is on Prozac and thinks the pharmacy accidentally gave her the Effexor which he believes she should no longer be taking I reviewed last note and med list effexor was d/c 3/28 discussed with Dr Mervin Dc to confirm this will have patient take effexor every other day for 1 week then she can stop continue prozac  voiced understanding

## 2018-05-30 ENCOUNTER — OFFICE VISIT (OUTPATIENT)
Dept: FAMILY MEDICINE CLINIC | Age: 78
End: 2018-05-30

## 2018-05-30 VITALS
HEART RATE: 90 BPM | OXYGEN SATURATION: 97 % | TEMPERATURE: 98.7 F | DIASTOLIC BLOOD PRESSURE: 62 MMHG | WEIGHT: 140.2 LBS | HEIGHT: 63 IN | SYSTOLIC BLOOD PRESSURE: 158 MMHG | BODY MASS INDEX: 24.84 KG/M2 | RESPIRATION RATE: 12 BRPM

## 2018-05-30 DIAGNOSIS — F43.23 ADJUSTMENT DISORDER WITH MIXED ANXIETY AND DEPRESSED MOOD: Primary | ICD-10-CM

## 2018-05-30 DIAGNOSIS — F51.04 PSYCHOPHYSIOLOGICAL INSOMNIA: ICD-10-CM

## 2018-05-30 DIAGNOSIS — F41.9 ANXIETY: ICD-10-CM

## 2018-05-30 PROBLEM — F32.A ANXIETY AND DEPRESSION: Status: RESOLVED | Noted: 2018-03-28 | Resolved: 2018-05-30

## 2018-05-30 RX ORDER — BUSPIRONE HYDROCHLORIDE 7.5 MG/1
7.5 TABLET ORAL
Qty: 60 TAB | Refills: 0 | Status: SHIPPED | OUTPATIENT
Start: 2018-05-30

## 2018-05-30 NOTE — PROGRESS NOTES
Assessment/Plan:    1. Adjustment disorder with mixed anxiety and depressed mood- cont prozac. Add buspar prn.   - busPIRone (BUSPAR) 7.5 mg tablet; Take 1 Tab by mouth two (2) times daily as needed. Dispense: 60 Tab; Refill: 0    2. Psychophysiological insomnia  -cont trazodone    3. Anxiety  -buspar    The plan was discussed with the patient. The patient verbalized understanding and is in agreement with the plan. All medication potential side effects were discussed with the patient. Health Maintenance:   Health Maintenance   Topic Date Due    Pneumococcal 65+ Low/Medium Risk (1 of 2 - PCV13) 09/18/2006    Influenza Age 9 to Adult  08/01/2018    GLAUCOMA SCREENING Q2Y  01/01/2019    MEDICARE YEARLY EXAM  03/01/2019    DTaP/Tdap/Td series (2 - Td) 02/28/2028    Bone Densitometry (Dexa) Screening  Addressed    ZOSTER VACCINE AGE 60>  Completed       Arturo Bobby is a 69 y.o. female and presents with Medication Evaluation (patient has list of concerns)     Subjective:  Her  also accompanies her with concerns. Depression and anxiety - started on prozac at last visit. She is down-sizing and moving and has this been very stressful. She has lived in this house for over 30 years. Her  reports she cries a lot and \"doesn't want to leave this house\". Her  states she just gets paralyzed and \"can't do anything\" to make decisions. She doesn't eat well. Has lost some weight. Having difficulty sleeping. She's having panic attacks. She still has memory difficulty, although MOCA testing was nml. ROS:  Constitutional: No recent weight change. No weakness/fatigue. No f/c. Cardiovascular: No CP/palpitations. No CONN/orthopnea/PND. Respiratory: No cough/sputum, dyspnea, wheezing. Gastointestinal: No dysphagia, reflux. No n/v. No constipation/diarrhea. No melena/rectal bleeding. Neurological: No seizures/numbness/weakness. No paresthesias.    Psychiatric:  + depression, +anxiety. The problem list was updated as a part of today's visit. Patient Active Problem List   Diagnosis Code    Allergic rhinitis J30.9    Panic disorder F41.0    Other and unspecified hyperlipidemia E78.5    Prediabetes R73.03    Neuropathy, leg G57.90    Fear of crowded places F40.00    DNR (do not resuscitate) Z66    Depression with anxiety F41. 8       The PSH, FH were reviewed. SH:  Social History   Substance Use Topics    Smoking status: Former Smoker     Quit date: 1/1/2007    Smokeless tobacco: Former User      Comment: 2007    Alcohol use 0.5 - 2.0 oz/week     1 - 4 Standard drinks or equivalent per week      Comment: 1-4/wk       Medications/Allergies:  Current Outpatient Prescriptions on File Prior to Visit   Medication Sig Dispense Refill    montelukast (SINGULAIR) 10 mg tablet Take 1 Tab by mouth daily. Indications: Allergic Rhinitis 90 Tab 1    Cetirizine (ZYRTEC) 10 mg cap Take 10 mg by mouth daily.  fluticasone (FLONASE ALLERGY RELIEF) 50 mcg/actuation nasal spray 2 Sprays by Both Nostrils route daily.  FLUoxetine (PROZAC) 20 mg capsule Take 1 Cap by mouth daily. 90 Cap 0    traZODone (DESYREL) 100 mg tablet Take 0.5 Tabs by mouth nightly as needed for Sleep. 90 Tab 0     No current facility-administered medications on file prior to visit. No Known Allergies    Objective:  Visit Vitals    /62 (BP 1 Location: Right arm, BP Patient Position: Sitting)    Pulse 90    Temp 98.7 °F (37.1 °C) (Oral)    Ht 5' 3\" (1.6 m)    Wt 140 lb 3.2 oz (63.6 kg)    SpO2 97%    BMI 24.84 kg/m2      Constitutional: Well developed, nourished, no distress, alert   CV: S1, S2.  RRR. No murmurs/rubs. No thrills palpated. No carotid bruits. Intact distal pulses. No edema. Pulm: No abnormalities on inspection. Clear to auscultation bilaterally. No wheezing/rhonchi. Normal effort. Psych: Appropriate affect, judgement and insight. Short-term memory intact.

## 2018-05-30 NOTE — PATIENT INSTRUCTIONS
Buspirone (By mouth)   Buspirone ([id number])  Treats anxiety. Brand Name(s):   There may be other brand names for this medicine. When This Medicine Should Not Be Used: You should not use this medicine if you have had an allergic reaction to buspirone. How to Use This Medicine:   Tablet  · Your doctor will tell you how much of this medicine to take and how often. Do not take more medicine or take it more often than your doctor tells you to. · You may take this medicine with or without food, but take it the same way each time. · You may need to take this medicine for 1 or 2 weeks before you begin to feel better. If a dose is missed:   · If you miss a dose or forget to take your medicine, take it as soon as you can. If it is almost time for your next dose, wait until then to take the medicine and skip the missed dose. · Do not use extra medicine to make up for a missed dose. How to Store and Dispose of This Medicine:   · Store the medicine at room temperature, away from heat, moisture, and direct light. · Keep all medicine out of the reach of children and never share your medicine with anyone. Drugs and Foods to Avoid:   Ask your doctor or pharmacist before using any other medicine, including over-the-counter medicines, vitamins, and herbal products. · You should not use buspirone when you are also using an MAO inhibitor (such as Eldepryl®, Marplan®, Nardil®, Parnate®). · Do not eat grapefruit, drink grapefruit juice, or drink alcohol while you are using buspirone. · Make sure your doctor knows if you are also using cimetidine (Sal Chase), dexamethasone (Decadron®), diltiazem (Mozell Blue), erythromycin (Erythro-Tab®), itraconazole (Sporanox®), nefazodone (Serzone®), rifampin (Rifadin®, Rifamate®, Rifater®), verapamil (Sarah Ground), or medicine for seizures (such as Dilantin®, Luminal®, Tegretol®).   · Make sure your doctor knows if you are using any medicines that make you sleepy (such as sleeping pills, cold and allergy medicine, narcotic pain relievers, or sedatives). Warnings While Using This Medicine:   · Make sure your doctor knows if you are pregnant or breastfeeding, or if you have kidney or liver disease. · Do not stop using this medicine suddenly without asking your doctor. You may need to slowly decrease your dose before stopping it completely. · This medicine may make you dizzy or drowsy. Avoid driving, using machines, or doing anything else that could be dangerous if you are not alert. Possible Side Effects While Using This Medicine:   Call your doctor right away if you notice any of these side effects:  · Fast or pounding heartbeat  · Numbness or tingling feeling  · Tremors or shaking  If you notice these less serious side effects, talk with your doctor:   · Drowsiness or weakness  · Dry mouth  · Feeling restless or nervous, trouble sleeping  · Headache  · Nausea, constipation, upset stomach  If you notice other side effects that you think are caused by this medicine, tell your doctor. Call your doctor for medical advice about side effects. You may report side effects to FDA at 3-861-FDA-3940  © 2017 [facility] Information is for End User's use only and may not be sold, redistributed or otherwise used for commercial purposes. The above information is an  only. It is not intended as medical advice for individual conditions or treatments. Talk to your doctor, nurse or pharmacist before following any medical regimen to see if it is safe and effective for you.

## 2018-05-30 NOTE — MR AVS SNAPSHOT
[address] [facility] [id number] [phone] Patient: Braxton Barrera MRN: [id number] ZFB:[id number] Visit Information Date & Time Provider Department Dept. Phone Encounter #  
 5/30/2018  2:45 PM Meredith Finley, 3 [facility] [phone] [id number] Upcoming Health Maintenance Date Due Pneumococcal 65+ Low/Medium Risk (1 of 2 - PCV13) 9/18/2006 Influenza Age 5 to Adult 8/1/2018 GLAUCOMA SCREENING Q2Y 1/1/2019 MEDICARE YEARLY EXAM 3/1/2019 DTaP/Tdap/Td series (2 - Td) 2/28/2028 Allergies as of 5/30/2018  Review Complete On: 5/30/2018 By: Meredith Finley MD  
 No Known Allergies Current Immunizations  Never Reviewed No immunizations on file. Not reviewed this visit You Were Diagnosed With   
  
 Codes Comments Adjustment disorder with mixed anxiety and depressed mood    -  Primary ICD-10-CM: F43.23 
ICD-9-CM: 309.28 Psychophysiological insomnia     ICD-10-CM: F51.04 
ICD-9-CM: 307.42 Anxiety     ICD-10-CM: F41.9 ICD-9-CM: 300.00 Vitals BP Pulse Temp Resp Height(growth percentile) Weight(growth percentile) 158/62 (BP 1 Location: Right arm, BP Patient Position: Sitting) 90 98.7 °F (37.1 °C) (Oral) 12 5' 3\" (1.6 m) 140 lb 3.2 oz (63.6 kg) SpO2 BMI OB Status Smoking Status 97% 24.84 kg/m2 Postmenopausal Former Smoker Vitals History BMI and BSA Data Body Mass Index Body Surface Area  
 24.84 kg/m 2 1.68 m 2 Preferred Pharmacy Pharmacy Name Phone Blaise Sawyer [address] [facility], [facility] [address] [facility] [phone] Your Updated Medication List  
  
   
This list is accurate as of 5/30/18  3:49 PM.  Always use your most recent med list.  
  
  
  
  
 busPIRone 7.5 mg tablet Commonly known as:  BUSPAR Take 1 Tab by mouth two (2) times daily as needed. FLONASE ALLERGY RELIEF 50 mcg/actuation nasal spray Generic drug:  fluticasone 2 Sprays by Both Nostrils route daily. FLUoxetine 20 mg capsule Commonly known as:  PROzac Take 1 Cap by mouth daily. montelukast 10 mg tablet Commonly known as:  SINGULAIR Take 1 Tab by mouth daily. Indications: Allergic Rhinitis  
  
 traZODone 100 mg tablet Commonly known as:  Griselda Homme Take 0.5 Tabs by mouth nightly as needed for Sleep. WOMEN'S MULTIVITAMIN PO Take  by mouth. ZyrTEC 10 mg Cap Generic drug:  Cetirizine Take 10 mg by mouth daily. Prescriptions Sent to Pharmacy Refills  
 busPIRone (BUSPAR) 7.5 mg tablet 0 Sig: Take 1 Tab by mouth two (2) times daily as needed. Class: Normal  
 Pharmacy: Lauren Diaz [facility], [address] [facility] #: [phone] Route: Oral  
  
Patient Instructions Buspirone (By mouth) Buspirone ([id number]) Treats anxiety. Brand Name(s):  
There may be other brand names for this medicine. When This Medicine Should Not Be Used: You should not use this medicine if you have had an allergic reaction to buspirone. How to Use This Medicine:  
Tablet · Your doctor will tell you how much of this medicine to take and how often. Do not take more medicine or take it more often than your doctor tells you to. · You may take this medicine with or without food, but take it the same way each time. · You may need to take this medicine for 1 or 2 weeks before you begin to feel better. If a dose is missed: · If you miss a dose or forget to take your medicine, take it as soon as you can. If it is almost time for your next dose, wait until then to take the medicine and skip the missed dose. · Do not use extra medicine to make up for a missed dose. How to Store and Dispose of This Medicine: · Store the medicine at room temperature, away from heat, moisture, and direct light. · Keep all medicine out of the reach of children and never share your medicine with anyone. Drugs and Foods to Avoid: Ask your doctor or pharmacist before using any other medicine, including over-the-counter medicines, vitamins, and herbal products. · You should not use buspirone when you are also using an MAO inhibitor (such as Eldepryl®, Marplan®, Nardil®, Parnate®). · Do not eat grapefruit, drink grapefruit juice, or drink alcohol while you are using buspirone. · Make sure your doctor knows if you are also using cimetidine (Carvin [place]), dexamethasone (Decadron®), diltiazem ([place]), erythromycin (Erythro-Tab®), itraconazole (Sporanox®), nefazodone (Serzone®), rifampin (Rifadin®, Rifamate®, Rifater®), verapamil (Fleurette Bun), or medicine for seizures (such as Dilantin®, Luminal®, Tegretol®). · Make sure your doctor knows if you are using any medicines that make you sleepy (such as sleeping pills, cold and allergy medicine, narcotic pain relievers, or sedatives). Warnings While Using This Medicine: · Make sure your doctor knows if you are pregnant or breastfeeding, or if you have kidney or liver disease. · Do not stop using this medicine suddenly without asking your doctor. You may need to slowly decrease your dose before stopping it completely. · This medicine may make you dizzy or drowsy. Avoid driving, using machines, or doing anything else that could be dangerous if you are not alert. Possible Side Effects While Using This Medicine:  
Call your doctor right away if you notice any of these side effects: 
· Fast or pounding heartbeat · Numbness or tingling feeling · Tremors or shaking If you notice these less serious side effects, talk with your doctor: · Drowsiness or weakness · Dry mouth · Feeling restless or nervous, trouble sleeping · Headache · Nausea, constipation, upset stomach If you notice other side effects that you think are caused by this medicine, tell your doctor. Call your doctor for medical advice about side effects. You may report side effects to FDA at 7-477-IBJ-9906 © 2017 [facility] Information is for End User's use only and may not be sold, redistributed or otherwise used for commercial purposes. The above information is an  only. It is not intended as medical advice for individual conditions or treatments. Talk to your doctor, nurse or pharmacist before following any medical regimen to see if it is safe and effective for you. Introducing [facility] SERVICES! Dear William Bowman: Thank you for requesting a [employer] account. Our records indicate that you have previously registered for a [employer] account but its currently inactive. Please call our [employer] support line at [phone]. Additional Information If you have questions, please visit the Frequently Asked Questions section of the [employer] website at [email or web address] Remember, [employer] is NOT to be used for urgent needs. For medical emergencies, dial 911. Now available from your iPhone and Android! Please provide this summary of care documentation to your next provider. Your primary care clinician is listed as Prashanth López. If you have any questions after today's visit, please call [phone].

## 2018-05-30 NOTE — PROGRESS NOTES
Jonathon Chavez is a 69 y.o. female (: 1940) presenting to address:    Chief Complaint   Patient presents with    Medication Evaluation     patient has list of concerns       Vitals:    18 1522   BP: 158/62   Pulse: 90   Resp: 12   Temp: 98.7 °F (37.1 °C)   TempSrc: Oral   SpO2: 97%   Weight: 140 lb 3.2 oz (63.6 kg)   Height: 5' 3\" (1.6 m)   PainSc:   0 - No pain       Hearing/Vision:   No exam data present    Learning Assessment:     Learning Assessment 3/19/2015   PRIMARY LEARNER Patient   HIGHEST LEVEL OF EDUCATION - PRIMARY LEARNER  4 YEARS OF COLLEGE   BARRIERS PRIMARY LEARNER NONE   CO-LEARNER CAREGIVER No   PRIMARY LANGUAGE ENGLISH    NEED No   LEARNER PREFERENCE PRIMARY DEMONSTRATION   ANSWERED BY Patient   RELATIONSHIP SELF     Depression Screening:     PHQ over the last two weeks 2018   PHQ Not Done -   Little interest or pleasure in doing things Nearly every day   Feeling down, depressed or hopeless Nearly every day   Total Score PHQ 2 6   Trouble falling or staying asleep, or sleeping too much -   Feeling tired or having little energy -   Poor appetite or overeating -   Feeling bad about yourself - or that you are a failure or have let yourself or your family down -   Trouble concentrating on things such as school, work, reading or watching TV -   Moving or speaking so slowly that other people could have noticed; or the opposite being so fidgety that others notice -   Thoughts of being better off dead, or hurting yourself in some way -   PHQ 9 Score -   How difficult have these problems made it for you to do your work, take care of your home and get along with others -     Fall Risk Assessment:     Fall Risk Assessment, last 12 mths 2018   Able to walk? Yes   Fall in past 12 months? Yes   Fall with injury? No   Number of falls in past 12 months 2   Fall Risk Score 2     Abuse Screening:     Abuse Screening Questionnaire 2018   Do you ever feel afraid of your partner? N   Are you in a relationship with someone who physically or mentally threatens you? N   Is it safe for you to go home? Y     Coordination of Care Questionaire:   1. Have you been to the ER, urgent care clinic since your last visit? Hospitalized since your last visit? NO    2. Have you seen or consulted any other health care providers outside of the [facility] [place] since your last visit? Include any pap smears or colon screening. NO    Advanced Directive:   1. Do you have an Advanced Directive? yes    2. Would you like information on Advanced Directives?  NO

## 2018-07-26 DIAGNOSIS — F32.1 CURRENT MODERATE EPISODE OF MAJOR DEPRESSIVE DISORDER WITHOUT PRIOR EPISODE (HCC): ICD-10-CM

## 2018-07-30 RX ORDER — FLUOXETINE HYDROCHLORIDE 20 MG/1
CAPSULE ORAL
Qty: 90 CAP | Refills: 0 | Status: SHIPPED | OUTPATIENT
Start: 2018-07-30

## 2020-01-01 ENCOUNTER — EXTERNAL RECORD (OUTPATIENT)
Dept: OTHER | Age: 80
End: 2020-01-01

## 2020-08-21 ENCOUNTER — APPOINTMENT (OUTPATIENT)
Dept: GERIATRIC MEDICINE | Age: 80
End: 2020-08-21

## 2020-08-28 ENCOUNTER — APPOINTMENT (OUTPATIENT)
Dept: GERIATRIC MEDICINE | Age: 80
End: 2020-08-28

## 2020-08-28 ENCOUNTER — OFFICE VISIT (OUTPATIENT)
Dept: GERIATRIC MEDICINE | Age: 80
End: 2020-08-28

## 2020-08-28 VITALS
OXYGEN SATURATION: 97 % | SYSTOLIC BLOOD PRESSURE: 137 MMHG | BODY MASS INDEX: 23.09 KG/M2 | HEART RATE: 98 BPM | WEIGHT: 130.3 LBS | DIASTOLIC BLOOD PRESSURE: 82 MMHG | HEIGHT: 63 IN

## 2020-08-28 DIAGNOSIS — G89.29 CHRONIC PAIN OF BOTH KNEES: ICD-10-CM

## 2020-08-28 DIAGNOSIS — G31.84 MILD COGNITIVE IMPAIRMENT: ICD-10-CM

## 2020-08-28 DIAGNOSIS — F32.0 CURRENT MILD EPISODE OF MAJOR DEPRESSIVE DISORDER, UNSPECIFIED WHETHER RECURRENT (CMD): ICD-10-CM

## 2020-08-28 DIAGNOSIS — M25.561 CHRONIC PAIN OF BOTH KNEES: ICD-10-CM

## 2020-08-28 DIAGNOSIS — L29.9 ITCHING: ICD-10-CM

## 2020-08-28 DIAGNOSIS — M25.562 CHRONIC PAIN OF BOTH KNEES: ICD-10-CM

## 2020-08-28 DIAGNOSIS — J30.2 SEASONAL ALLERGIES: ICD-10-CM

## 2020-08-28 DIAGNOSIS — Z00.00 ENCOUNTER FOR MEDICAL EXAMINATION TO ESTABLISH CARE: Primary | ICD-10-CM

## 2020-08-28 PROBLEM — F32.A DEPRESSION: Status: ACTIVE | Noted: 2020-08-28

## 2020-08-28 PROBLEM — R61 NIGHT SWEATS: Status: ACTIVE | Noted: 2020-08-28

## 2020-08-28 PROBLEM — G57.93 NEUROPATHY OF BOTH FEET: Status: ACTIVE | Noted: 2020-08-28

## 2020-08-28 PROBLEM — F17.200 TOBACCO DEPENDENCE: Status: ACTIVE | Noted: 2020-08-28

## 2020-08-28 PROCEDURE — 99205 OFFICE O/P NEW HI 60 MIN: CPT | Performed by: NURSE PRACTITIONER

## 2020-08-28 RX ORDER — MONTELUKAST SODIUM 10 MG/1
10 TABLET ORAL NIGHTLY
COMMUNITY
End: 2020-12-04 | Stop reason: ALTCHOICE

## 2020-08-28 RX ORDER — FLUTICASONE PROPIONATE 50 MCG
1 SPRAY, SUSPENSION (ML) NASAL DAILY
COMMUNITY
End: 2021-01-08 | Stop reason: SDUPTHER

## 2020-08-28 RX ORDER — DONEPEZIL HYDROCHLORIDE 10 MG/1
10 TABLET, FILM COATED ORAL DAILY
COMMUNITY
End: 2021-01-08 | Stop reason: SDUPTHER

## 2020-08-28 RX ORDER — ATORVASTATIN CALCIUM 10 MG/1
10 TABLET, FILM COATED ORAL DAILY
COMMUNITY
End: 2021-01-08 | Stop reason: SDUPTHER

## 2020-08-28 RX ORDER — MELOXICAM 15 MG/1
15 TABLET ORAL DAILY
COMMUNITY
End: 2021-01-08 | Stop reason: SDUPTHER

## 2020-08-28 RX ORDER — TRAZODONE HYDROCHLORIDE 50 MG/1
50 TABLET ORAL NIGHTLY
COMMUNITY
End: 2020-11-06 | Stop reason: DRUGHIGH

## 2020-08-28 RX ORDER — AZELASTINE 1 MG/ML
1 SPRAY, METERED NASAL 2 TIMES DAILY
COMMUNITY
End: 2021-01-08 | Stop reason: SDUPTHER

## 2020-08-28 RX ORDER — L. ACIDOPHILUS/PECTIN, CITRUS 25MM-100MG
1 TABLET ORAL DAILY
COMMUNITY

## 2020-08-28 RX ORDER — DULOXETIN HYDROCHLORIDE 30 MG/1
30 CAPSULE, DELAYED RELEASE ORAL DAILY
COMMUNITY
End: 2020-12-18 | Stop reason: ALTCHOICE

## 2020-08-28 RX ORDER — DIAPER,BRIEF,INFANT-TODD,DISP
EACH MISCELLANEOUS 3 TIMES DAILY
Qty: 15 G | Refills: 2 | Status: SHIPPED | OUTPATIENT
Start: 2020-08-28

## 2020-09-16 ENCOUNTER — APPOINTMENT (OUTPATIENT)
Dept: GERIATRIC MEDICINE | Age: 80
End: 2020-09-16

## 2020-09-21 ENCOUNTER — APPOINTMENT (OUTPATIENT)
Dept: GERIATRIC MEDICINE | Age: 80
End: 2020-09-21

## 2020-10-07 ENCOUNTER — OFFICE VISIT (OUTPATIENT)
Dept: GERIATRIC MEDICINE | Age: 80
End: 2020-10-07

## 2020-10-07 ENCOUNTER — OFFICE VISIT (OUTPATIENT)
Dept: BEHAVIORAL HEALTH | Age: 80
End: 2020-10-07

## 2020-10-07 VITALS
BODY MASS INDEX: 22.79 KG/M2 | DIASTOLIC BLOOD PRESSURE: 79 MMHG | SYSTOLIC BLOOD PRESSURE: 114 MMHG | HEIGHT: 63 IN | HEART RATE: 98 BPM | OXYGEN SATURATION: 96 % | WEIGHT: 128.6 LBS

## 2020-10-07 DIAGNOSIS — R63.4 ABNORMAL LOSS OF WEIGHT: ICD-10-CM

## 2020-10-07 DIAGNOSIS — G31.84 MILD COGNITIVE IMPAIRMENT: Primary | ICD-10-CM

## 2020-10-07 DIAGNOSIS — F32.A DEPRESSION, UNSPECIFIED DEPRESSION TYPE: ICD-10-CM

## 2020-10-07 DIAGNOSIS — G57.93 NEUROPATHY OF BOTH FEET: ICD-10-CM

## 2020-10-07 DIAGNOSIS — G47.33 OSA ON CPAP: ICD-10-CM

## 2020-10-07 DIAGNOSIS — Z91.89 AT RISK FOR SIDE EFFECT OF MEDICATION: ICD-10-CM

## 2020-10-07 DIAGNOSIS — G31.84 MILD COGNITIVE IMPAIRMENT: ICD-10-CM

## 2020-10-07 DIAGNOSIS — Z78.9 ALCOHOL USE: ICD-10-CM

## 2020-10-07 DIAGNOSIS — F17.200 TOBACCO USE DISORDER: ICD-10-CM

## 2020-10-07 DIAGNOSIS — R41.3 MEMORY PROBLEM: Primary | ICD-10-CM

## 2020-10-07 PROCEDURE — 96133 NRPSYC TST EVAL PHYS/QHP EA: CPT | Performed by: PSYCHOLOGIST

## 2020-10-07 PROCEDURE — 99215 OFFICE O/P EST HI 40 MIN: CPT | Performed by: INTERNAL MEDICINE

## 2020-10-07 PROCEDURE — 96132 NRPSYC TST EVAL PHYS/QHP 1ST: CPT | Performed by: PSYCHOLOGIST

## 2020-10-07 PROCEDURE — 96139 PSYCL/NRPSYC TST TECH EA: CPT | Performed by: PSYCHOLOGIST

## 2020-10-07 PROCEDURE — 96138 PSYCL/NRPSYC TECH 1ST: CPT | Performed by: PSYCHOLOGIST

## 2020-10-08 ASSESSMENT — ENCOUNTER SYMPTOMS
SINUS PAIN: 1
CHILLS: 0
RHINORRHEA: 1
COUGH: 0
NUMBNESS: 1
NAUSEA: 0
CONSTIPATION: 0
SINUS PRESSURE: 1
SHORTNESS OF BREATH: 0
CHEST TIGHTNESS: 0
UNEXPECTED WEIGHT CHANGE: 1
VOMITING: 0
APPETITE CHANGE: 1
TROUBLE SWALLOWING: 0
ACTIVITY CHANGE: 1
DIARRHEA: 1
ABDOMINAL PAIN: 0
BLOOD IN STOOL: 0
NERVOUS/ANXIOUS: 1
CONFUSION: 0
FEVER: 0

## 2020-10-09 ENCOUNTER — E-ADVICE (OUTPATIENT)
Dept: GERIATRIC MEDICINE | Age: 80
End: 2020-10-09

## 2020-10-13 ENCOUNTER — E-ADVICE (OUTPATIENT)
Dept: GERIATRIC MEDICINE | Age: 80
End: 2020-10-13

## 2020-10-16 ENCOUNTER — OFFICE VISIT (OUTPATIENT)
Dept: GERIATRIC MEDICINE | Age: 80
End: 2020-10-16

## 2020-10-16 VITALS
BODY MASS INDEX: 22.78 KG/M2 | DIASTOLIC BLOOD PRESSURE: 77 MMHG | HEART RATE: 75 BPM | SYSTOLIC BLOOD PRESSURE: 136 MMHG | HEIGHT: 63 IN | OXYGEN SATURATION: 95 % | TEMPERATURE: 96.8 F

## 2020-10-16 DIAGNOSIS — G31.84 MILD COGNITIVE IMPAIRMENT: Primary | ICD-10-CM

## 2020-10-16 DIAGNOSIS — Z23 NEED FOR IMMUNIZATION AGAINST INFLUENZA: ICD-10-CM

## 2020-10-16 DIAGNOSIS — Z23 ENCOUNTER FOR IMMUNIZATION: ICD-10-CM

## 2020-10-16 DIAGNOSIS — R30.0 DYSURIA: ICD-10-CM

## 2020-10-16 DIAGNOSIS — F17.200 TOBACCO DEPENDENCE: ICD-10-CM

## 2020-10-16 PROBLEM — Z71.6 ENCOUNTER FOR SMOKING CESSATION COUNSELING: Status: ACTIVE | Noted: 2020-10-16

## 2020-10-16 LAB
BASOPHILS # BLD: 0.1 K/MCL (ref 0–0.3)
BASOPHILS NFR BLD: 1 %
DEPRECATED RDW RBC: 48.6 FL (ref 39–50)
EOSINOPHIL # BLD: 0.1 K/MCL (ref 0.1–0.5)
EOSINOPHIL NFR BLD: 1 %
ERYTHROCYTE [DISTWIDTH] IN BLOOD: 15.1 % (ref 11–15)
HCT VFR BLD CALC: 41.7 % (ref 36–46.5)
HGB BLD-MCNC: 13.5 G/DL (ref 12–15.5)
IMM GRANULOCYTES # BLD AUTO: 0 K/MCL (ref 0–0.2)
IMM GRANULOCYTES # BLD: 0 %
LYMPHOCYTES # BLD: 2.1 K/MCL (ref 1–4)
LYMPHOCYTES NFR BLD: 23 %
MCH RBC QN AUTO: 28.5 PG (ref 26–34)
MCHC RBC AUTO-ENTMCNC: 32.4 G/DL (ref 32–36.5)
MCV RBC AUTO: 88 FL (ref 78–100)
MONOCYTES # BLD: 0.8 K/MCL (ref 0.3–0.9)
MONOCYTES NFR BLD: 9 %
NEUTROPHILS # BLD: 6 K/MCL (ref 1.8–7.7)
NEUTROPHILS NFR BLD: 66 %
NRBC BLD MANUAL-RTO: 0 /100 WBC
PLATELET # BLD AUTO: 450 K/MCL (ref 140–450)
RBC # BLD: 4.74 MIL/MCL (ref 4–5.2)
WBC # BLD: 9 K/MCL (ref 4.2–11)

## 2020-10-16 PROCEDURE — 87088 URINE BACTERIA CULTURE: CPT | Performed by: CLINICAL MEDICAL LABORATORY

## 2020-10-16 PROCEDURE — 87186 SC STD MICRODIL/AGAR DIL: CPT | Performed by: CLINICAL MEDICAL LABORATORY

## 2020-10-16 PROCEDURE — 90732 PPSV23 VACC 2 YRS+ SUBQ/IM: CPT | Performed by: NURSE PRACTITIONER

## 2020-10-16 PROCEDURE — 85025 COMPLETE CBC W/AUTO DIFF WBC: CPT | Performed by: CLINICAL MEDICAL LABORATORY

## 2020-10-16 PROCEDURE — 99215 OFFICE O/P EST HI 40 MIN: CPT | Performed by: NURSE PRACTITIONER

## 2020-10-16 PROCEDURE — 82607 VITAMIN B-12: CPT | Performed by: CLINICAL MEDICAL LABORATORY

## 2020-10-16 PROCEDURE — 87086 URINE CULTURE/COLONY COUNT: CPT | Performed by: CLINICAL MEDICAL LABORATORY

## 2020-10-16 PROCEDURE — G0009 ADMIN PNEUMOCOCCAL VACCINE: HCPCS | Performed by: NURSE PRACTITIONER

## 2020-10-16 PROCEDURE — 90662 IIV NO PRSV INCREASED AG IM: CPT | Performed by: NURSE PRACTITIONER

## 2020-10-16 PROCEDURE — 84443 ASSAY THYROID STIM HORMONE: CPT | Performed by: CLINICAL MEDICAL LABORATORY

## 2020-10-16 PROCEDURE — 81001 URINALYSIS AUTO W/SCOPE: CPT | Performed by: CLINICAL MEDICAL LABORATORY

## 2020-10-16 PROCEDURE — 82746 ASSAY OF FOLIC ACID SERUM: CPT | Performed by: CLINICAL MEDICAL LABORATORY

## 2020-10-16 PROCEDURE — G0008 ADMIN INFLUENZA VIRUS VAC: HCPCS | Performed by: NURSE PRACTITIONER

## 2020-10-16 PROCEDURE — 80053 COMPREHEN METABOLIC PANEL: CPT | Performed by: CLINICAL MEDICAL LABORATORY

## 2020-10-16 RX ORDER — NICOTINE 4 MG/1
1 INHALANT RESPIRATORY (INHALATION) PRN
Qty: 30 EACH | Refills: 3 | Status: SHIPPED | OUTPATIENT
Start: 2020-10-16 | End: 2020-11-06 | Stop reason: ALTCHOICE

## 2020-10-17 LAB
ALBUMIN SERPL-MCNC: 4.1 G/DL (ref 3.6–5.1)
ALBUMIN/GLOB SERPL: 1.4 {RATIO} (ref 1–2.4)
ALP SERPL-CCNC: 59 UNITS/L (ref 45–117)
ALT SERPL-CCNC: 22 UNITS/L
ANION GAP SERPL CALC-SCNC: 9 MMOL/L (ref 10–20)
APPEARANCE UR: ABNORMAL
AST SERPL-CCNC: 14 UNITS/L
BACTERIA #/AREA URNS HPF: ABNORMAL /HPF
BILIRUB SERPL-MCNC: 0.5 MG/DL (ref 0.2–1)
BILIRUB UR QL STRIP: NEGATIVE
BUN SERPL-MCNC: 18 MG/DL (ref 6–20)
BUN/CREAT SERPL: 32 (ref 7–25)
CALCIUM SERPL-MCNC: 9.1 MG/DL (ref 8.4–10.2)
CHLORIDE SERPL-SCNC: 103 MMOL/L (ref 98–107)
CO2 SERPL-SCNC: 27 MMOL/L (ref 21–32)
COLOR UR: YELLOW
CREAT SERPL-MCNC: 0.57 MG/DL (ref 0.51–0.95)
FASTING DURATION TIME PATIENT: ABNORMAL H
FOLATE SERPL-MCNC: >24 NG/ML
GFR SERPLBLD BASED ON 1.73 SQ M-ARVRAT: 88 ML/MIN/1.73M2
GLOBULIN SER-MCNC: 3 G/DL (ref 2–4)
GLUCOSE SERPL-MCNC: 94 MG/DL (ref 65–99)
GLUCOSE UR STRIP-MCNC: NEGATIVE MG/DL
HGB UR QL STRIP: ABNORMAL
HYALINE CASTS #/AREA URNS LPF: ABNORMAL /LPF
KETONES UR STRIP-MCNC: NEGATIVE MG/DL
LEUKOCYTE ESTERASE UR QL STRIP: ABNORMAL
NITRITE UR QL STRIP: POSITIVE
PH UR STRIP: 6 [PH] (ref 5–7)
POTASSIUM SERPL-SCNC: 4.2 MMOL/L (ref 3.4–5.1)
PROT SERPL-MCNC: 7.1 G/DL (ref 6.4–8.2)
PROT UR STRIP-MCNC: NEGATIVE MG/DL
RBC #/AREA URNS HPF: ABNORMAL /HPF
SODIUM SERPL-SCNC: 135 MMOL/L (ref 135–145)
SP GR UR STRIP: 1.01 (ref 1–1.03)
SQUAMOUS #/AREA URNS HPF: ABNORMAL /HPF
TSH SERPL-ACNC: 1.06 MCUNITS/ML (ref 0.35–5)
UROBILINOGEN UR STRIP-MCNC: 0.2 MG/DL
VIT B12 SERPL-MCNC: 1340 PG/ML (ref 211–911)
WBC #/AREA URNS HPF: ABNORMAL /HPF

## 2020-10-17 RX ORDER — CEPHALEXIN 500 MG/1
500 CAPSULE ORAL 2 TIMES DAILY
Qty: 14 CAPSULE | Refills: 0 | Status: SHIPPED | OUTPATIENT
Start: 2020-10-17 | End: 2020-10-24

## 2020-10-19 LAB — BACTERIA UR CULT: ABNORMAL

## 2020-10-22 ENCOUNTER — TELEPHONE (OUTPATIENT)
Dept: GERIATRIC MEDICINE | Age: 80
End: 2020-10-22

## 2020-10-27 ENCOUNTER — APPOINTMENT (OUTPATIENT)
Dept: LAB | Age: 80
End: 2020-10-27

## 2020-10-27 ENCOUNTER — TELEPHONE (OUTPATIENT)
Dept: GERIATRIC MEDICINE | Age: 80
End: 2020-10-27

## 2020-10-27 DIAGNOSIS — R30.0 DYSURIA: Primary | ICD-10-CM

## 2020-10-27 LAB
APPEARANCE UR: CLEAR
BILIRUB UR QL STRIP: NEGATIVE
COLOR UR: YELLOW
GLUCOSE UR STRIP-MCNC: NEGATIVE MG/DL
HGB UR QL STRIP: NEGATIVE
KETONES UR STRIP-MCNC: NEGATIVE MG/DL
LEUKOCYTE ESTERASE UR QL STRIP: NEGATIVE
NITRITE UR QL STRIP: NEGATIVE
PH UR STRIP: 6.5 [PH] (ref 5–7)
PROT UR STRIP-MCNC: NEGATIVE MG/DL
SP GR UR STRIP: 1.01 (ref 1–1.03)
UROBILINOGEN UR STRIP-MCNC: 0.2 MG/DL

## 2020-10-27 PROCEDURE — 81003 URINALYSIS AUTO W/O SCOPE: CPT | Performed by: CLINICAL MEDICAL LABORATORY

## 2020-10-27 RX ORDER — NITROFURANTOIN MACROCRYSTALS 100 MG/1
100 CAPSULE ORAL 4 TIMES DAILY
Qty: 28 CAPSULE | Refills: 0 | Status: SHIPPED | OUTPATIENT
Start: 2020-10-27 | End: 2020-11-03

## 2020-10-28 ENCOUNTER — TELEPHONE (OUTPATIENT)
Dept: GERIATRIC MEDICINE | Age: 80
End: 2020-10-28

## 2020-10-30 ENCOUNTER — HOSPITAL ENCOUNTER (OUTPATIENT)
Dept: CT IMAGING | Age: 80
Discharge: HOME | End: 2020-10-30
Attending: NURSE PRACTITIONER

## 2020-10-30 ENCOUNTER — OFFICE VISIT (OUTPATIENT)
Dept: GERIATRIC MEDICINE | Age: 80
End: 2020-10-30

## 2020-10-30 VITALS
WEIGHT: 130 LBS | HEART RATE: 98 BPM | BODY MASS INDEX: 23.04 KG/M2 | HEIGHT: 63 IN | OXYGEN SATURATION: 97 % | SYSTOLIC BLOOD PRESSURE: 138 MMHG | DIASTOLIC BLOOD PRESSURE: 81 MMHG

## 2020-10-30 DIAGNOSIS — R10.30 LOWER ABDOMINAL PAIN: ICD-10-CM

## 2020-10-30 DIAGNOSIS — R30.0 DYSURIA: ICD-10-CM

## 2020-10-30 DIAGNOSIS — R30.0 DYSURIA: Primary | ICD-10-CM

## 2020-10-30 LAB
ANION GAP SERPL CALC-SCNC: 12 MMOL/L (ref 10–20)
BASOPHILS # BLD: 0.1 K/MCL (ref 0–0.3)
BASOPHILS NFR BLD: 1 %
BUN SERPL-MCNC: 29 MG/DL (ref 6–20)
BUN/CREAT SERPL: 36 (ref 7–25)
CALCIUM SERPL-MCNC: 9.2 MG/DL (ref 8.4–10.2)
CHLORIDE SERPL-SCNC: 104 MMOL/L (ref 98–107)
CO2 SERPL-SCNC: 30 MMOL/L (ref 21–32)
CREAT SERPL-MCNC: 0.81 MG/DL (ref 0.51–0.95)
DEPRECATED RDW RBC: 47.2 FL (ref 39–50)
EOSINOPHIL # BLD: 0.2 K/MCL (ref 0.1–0.5)
EOSINOPHIL NFR BLD: 2 %
ERYTHROCYTE [DISTWIDTH] IN BLOOD: 14.6 % (ref 11–15)
FASTING DURATION TIME PATIENT: ABNORMAL H
GFR SERPLBLD BASED ON 1.73 SQ M-ARVRAT: 69 ML/MIN/1.73M2
GLUCOSE SERPL-MCNC: 135 MG/DL (ref 65–99)
HCT VFR BLD CALC: 41 % (ref 36–46.5)
HGB BLD-MCNC: 13.2 G/DL (ref 12–15.5)
IMM GRANULOCYTES # BLD AUTO: 0 K/MCL (ref 0–0.2)
IMM GRANULOCYTES # BLD: 0 %
LDH SERPL L TO P-CCNC: 220 UNITS/L (ref 82–240)
LIPASE SERPL-CCNC: 112 UNITS/L (ref 73–393)
LYMPHOCYTES # BLD: 2.5 K/MCL (ref 1–4)
LYMPHOCYTES NFR BLD: 32 %
MCH RBC QN AUTO: 28 PG (ref 26–34)
MCHC RBC AUTO-ENTMCNC: 32.2 G/DL (ref 32–36.5)
MCV RBC AUTO: 87 FL (ref 78–100)
MONOCYTES # BLD: 0.7 K/MCL (ref 0.3–0.9)
MONOCYTES NFR BLD: 9 %
NEUTROPHILS # BLD: 4.5 K/MCL (ref 1.8–7.7)
NEUTROPHILS NFR BLD: 56 %
NRBC BLD MANUAL-RTO: 0 /100 WBC
PLATELET # BLD AUTO: 467 K/MCL (ref 140–450)
POTASSIUM SERPL-SCNC: 4.6 MMOL/L (ref 3.4–5.1)
RBC # BLD: 4.71 MIL/MCL (ref 4–5.2)
SODIUM SERPL-SCNC: 141 MMOL/L (ref 135–145)
WBC # BLD: 7.9 K/MCL (ref 4.2–11)

## 2020-10-30 PROCEDURE — 10002805 HB CONTRAST AGENT: Performed by: NURSE PRACTITIONER

## 2020-10-30 PROCEDURE — 74177 CT ABD & PELVIS W/CONTRAST: CPT

## 2020-10-30 PROCEDURE — 80048 BASIC METABOLIC PNL TOTAL CA: CPT | Performed by: CLINICAL MEDICAL LABORATORY

## 2020-10-30 PROCEDURE — 85025 COMPLETE CBC W/AUTO DIFF WBC: CPT | Performed by: CLINICAL MEDICAL LABORATORY

## 2020-10-30 PROCEDURE — 83690 ASSAY OF LIPASE: CPT | Performed by: CLINICAL MEDICAL LABORATORY

## 2020-10-30 PROCEDURE — 99214 OFFICE O/P EST MOD 30 MIN: CPT | Performed by: NURSE PRACTITIONER

## 2020-10-30 PROCEDURE — 74177 CT ABD & PELVIS W/CONTRAST: CPT | Performed by: RADIOLOGY

## 2020-10-30 PROCEDURE — 83615 LACTATE (LD) (LDH) ENZYME: CPT | Performed by: CLINICAL MEDICAL LABORATORY

## 2020-10-30 RX ADMIN — IOPAMIDOL 100 ML: 612 INJECTION, SOLUTION INTRAVENOUS at 17:36

## 2020-10-31 LAB — RAINBOW EXTRA TUBES HOLD SPECIMEN: NORMAL

## 2020-11-06 ENCOUNTER — OFFICE VISIT (OUTPATIENT)
Dept: GERIATRIC MEDICINE | Age: 80
End: 2020-11-06

## 2020-11-06 VITALS
HEIGHT: 63 IN | BODY MASS INDEX: 23.12 KG/M2 | HEART RATE: 96 BPM | OXYGEN SATURATION: 98 % | DIASTOLIC BLOOD PRESSURE: 77 MMHG | SYSTOLIC BLOOD PRESSURE: 121 MMHG | WEIGHT: 130.5 LBS

## 2020-11-06 DIAGNOSIS — G31.84 MILD COGNITIVE IMPAIRMENT: ICD-10-CM

## 2020-11-06 DIAGNOSIS — K59.00 CONSTIPATION, UNSPECIFIED CONSTIPATION TYPE: Primary | ICD-10-CM

## 2020-11-06 PROCEDURE — 99213 OFFICE O/P EST LOW 20 MIN: CPT | Performed by: NURSE PRACTITIONER

## 2020-11-06 RX ORDER — TRAZODONE HYDROCHLORIDE 50 MG/1
25 TABLET ORAL NIGHTLY
Qty: 30 TABLET | Refills: 5 | Status: SHIPPED | OUTPATIENT
Start: 2020-11-06 | End: 2021-01-08 | Stop reason: SDUPTHER

## 2020-11-06 RX ORDER — POLYETHYLENE GLYCOL 3350 17 G/17G
17 POWDER, FOR SOLUTION ORAL DAILY
Qty: 255 G | Refills: 3 | Status: SHIPPED | OUTPATIENT
Start: 2020-11-06

## 2020-11-13 ENCOUNTER — OFFICE VISIT (OUTPATIENT)
Dept: GERIATRIC MEDICINE | Age: 80
End: 2020-11-13

## 2020-11-13 VITALS
DIASTOLIC BLOOD PRESSURE: 90 MMHG | HEIGHT: 63 IN | OXYGEN SATURATION: 97 % | BODY MASS INDEX: 23.04 KG/M2 | SYSTOLIC BLOOD PRESSURE: 154 MMHG | HEART RATE: 78 BPM | WEIGHT: 130 LBS

## 2020-11-13 DIAGNOSIS — K59.00 CONSTIPATION, UNSPECIFIED CONSTIPATION TYPE: ICD-10-CM

## 2020-11-13 DIAGNOSIS — R30.0 DYSURIA: Primary | ICD-10-CM

## 2020-11-13 DIAGNOSIS — R07.89 CHEST PRESSURE: ICD-10-CM

## 2020-11-13 PROBLEM — Z00.00 ENCOUNTER FOR MEDICAL EXAMINATION TO ESTABLISH CARE: Status: RESOLVED | Noted: 2020-08-28 | Resolved: 2020-11-13

## 2020-11-13 PROBLEM — Z71.6 ENCOUNTER FOR SMOKING CESSATION COUNSELING: Status: RESOLVED | Noted: 2020-10-16 | Resolved: 2020-11-13

## 2020-11-13 LAB
AMORPH SED URNS QL MICRO: PRESENT
APPEARANCE UR: CLEAR
BACTERIA #/AREA URNS HPF: ABNORMAL /HPF
BILIRUB UR QL STRIP: NEGATIVE
COLOR UR: YELLOW
GLUCOSE UR STRIP-MCNC: NEGATIVE MG/DL
HGB UR QL STRIP: ABNORMAL
HYALINE CASTS #/AREA URNS LPF: ABNORMAL /LPF
KETONES UR STRIP-MCNC: NEGATIVE MG/DL
LEUKOCYTE ESTERASE UR QL STRIP: ABNORMAL
NITRITE UR QL STRIP: NEGATIVE
PH UR STRIP: 6 [PH] (ref 5–7)
PROT UR STRIP-MCNC: NEGATIVE MG/DL
RBC #/AREA URNS HPF: ABNORMAL /HPF
SP GR UR STRIP: 1 (ref 1–1.03)
SQUAMOUS #/AREA URNS HPF: ABNORMAL /HPF
UROBILINOGEN UR STRIP-MCNC: 0.2 MG/DL
WBC #/AREA URNS HPF: ABNORMAL /HPF

## 2020-11-13 PROCEDURE — 99213 OFFICE O/P EST LOW 20 MIN: CPT | Performed by: NURSE PRACTITIONER

## 2020-11-13 PROCEDURE — 81001 URINALYSIS AUTO W/SCOPE: CPT | Performed by: CLINICAL MEDICAL LABORATORY

## 2020-11-13 PROCEDURE — 87086 URINE CULTURE/COLONY COUNT: CPT | Performed by: CLINICAL MEDICAL LABORATORY

## 2020-11-13 RX ORDER — NITROFURANTOIN MACROCRYSTALS 100 MG/1
100 CAPSULE ORAL 4 TIMES DAILY
Qty: 40 CAPSULE | Refills: 0 | Status: SHIPPED | OUTPATIENT
Start: 2020-11-13 | End: 2020-11-20 | Stop reason: CLARIF

## 2020-11-14 ENCOUNTER — TELEPHONE (OUTPATIENT)
Dept: GERIATRIC MEDICINE | Age: 80
End: 2020-11-14

## 2020-11-14 LAB — BACTERIA UR CULT: NORMAL

## 2020-11-17 ENCOUNTER — HOSPITAL ENCOUNTER (OUTPATIENT)
Dept: MRI IMAGING | Age: 80
Discharge: HOME | End: 2020-11-17
Attending: NURSE PRACTITIONER

## 2020-11-17 ENCOUNTER — APPOINTMENT (OUTPATIENT)
Dept: CT IMAGING | Age: 80
End: 2020-11-17
Attending: NURSE PRACTITIONER

## 2020-11-17 DIAGNOSIS — G31.84 MILD COGNITIVE IMPAIRMENT: ICD-10-CM

## 2020-11-17 PROCEDURE — 70551 MRI BRAIN STEM W/O DYE: CPT | Performed by: RADIOLOGY

## 2020-11-17 PROCEDURE — 70551 MRI BRAIN STEM W/O DYE: CPT

## 2020-11-20 ENCOUNTER — TELEPHONE (OUTPATIENT)
Dept: GERIATRIC MEDICINE | Age: 80
End: 2020-11-20

## 2020-11-20 ENCOUNTER — OFFICE VISIT (OUTPATIENT)
Dept: GERIATRIC MEDICINE | Age: 80
End: 2020-11-20

## 2020-11-20 VITALS
HEIGHT: 63 IN | WEIGHT: 126 LBS | BODY MASS INDEX: 22.32 KG/M2 | DIASTOLIC BLOOD PRESSURE: 70 MMHG | OXYGEN SATURATION: 98 % | SYSTOLIC BLOOD PRESSURE: 128 MMHG | HEART RATE: 92 BPM

## 2020-11-20 DIAGNOSIS — R07.89 CHEST PRESSURE: Primary | ICD-10-CM

## 2020-11-20 DIAGNOSIS — G31.84 MILD COGNITIVE IMPAIRMENT: ICD-10-CM

## 2020-11-20 DIAGNOSIS — R30.0 DYSURIA: ICD-10-CM

## 2020-11-20 PROBLEM — F17.200 TOBACCO DEPENDENCE: Status: RESOLVED | Noted: 2020-08-28 | Resolved: 2020-11-20

## 2020-11-20 PROCEDURE — 99214 OFFICE O/P EST MOD 30 MIN: CPT | Performed by: NURSE PRACTITIONER

## 2020-11-20 PROCEDURE — 93010 ELECTROCARDIOGRAM REPORT: CPT | Performed by: NURSE PRACTITIONER

## 2020-11-20 RX ORDER — ESTRADIOL 0.1 MG/G
1 CREAM VAGINAL
Qty: 12 G | Refills: 2 | Status: SHIPPED | OUTPATIENT
Start: 2020-11-20 | End: 2021-02-18

## 2020-12-01 ENCOUNTER — TELEPHONE (OUTPATIENT)
Dept: OTHER | Age: 80
End: 2020-12-01

## 2020-12-04 ENCOUNTER — V-VISIT (OUTPATIENT)
Dept: GERIATRIC MEDICINE | Age: 80
End: 2020-12-04

## 2020-12-04 DIAGNOSIS — K59.00 CONSTIPATION, UNSPECIFIED CONSTIPATION TYPE: ICD-10-CM

## 2020-12-04 DIAGNOSIS — G89.29 CHRONIC PAIN OF BOTH KNEES: Primary | ICD-10-CM

## 2020-12-04 DIAGNOSIS — R07.89 CHEST PRESSURE: ICD-10-CM

## 2020-12-04 DIAGNOSIS — R10.30 LOWER ABDOMINAL PAIN: ICD-10-CM

## 2020-12-04 DIAGNOSIS — M25.562 CHRONIC PAIN OF BOTH KNEES: Primary | ICD-10-CM

## 2020-12-04 DIAGNOSIS — M25.561 CHRONIC PAIN OF BOTH KNEES: Primary | ICD-10-CM

## 2020-12-04 DIAGNOSIS — G31.84 MILD COGNITIVE IMPAIRMENT: ICD-10-CM

## 2020-12-04 PROCEDURE — 99213 OFFICE O/P EST LOW 20 MIN: CPT | Performed by: NURSE PRACTITIONER

## 2020-12-04 RX ORDER — ACETAMINOPHEN 500 MG
1000 TABLET ORAL 3 TIMES DAILY
Status: SHIPPED | COMMUNITY
Start: 2020-12-04

## 2020-12-09 ENCOUNTER — OFFICE VISIT (OUTPATIENT)
Dept: CARDIOLOGY | Age: 80
End: 2020-12-09
Attending: INTERNAL MEDICINE

## 2020-12-09 VITALS
HEART RATE: 79 BPM | RESPIRATION RATE: 16 BRPM | OXYGEN SATURATION: 96 % | BODY MASS INDEX: 22.47 KG/M2 | WEIGHT: 126.8 LBS | SYSTOLIC BLOOD PRESSURE: 130 MMHG | DIASTOLIC BLOOD PRESSURE: 68 MMHG | HEIGHT: 63 IN

## 2020-12-09 DIAGNOSIS — R07.89 CHEST PRESSURE: Primary | ICD-10-CM

## 2020-12-09 DIAGNOSIS — I25.10 CORONARY ARTERY DISEASE, ANGINA PRESENCE UNSPECIFIED, UNSPECIFIED VESSEL OR LESION TYPE, UNSPECIFIED WHETHER NATIVE OR TRANSPLANTED HEART: ICD-10-CM

## 2020-12-09 PROCEDURE — 99203 OFFICE O/P NEW LOW 30 MIN: CPT | Performed by: INTERNAL MEDICINE

## 2020-12-09 PROCEDURE — 99212 OFFICE O/P EST SF 10 MIN: CPT

## 2020-12-18 ENCOUNTER — V-VISIT (OUTPATIENT)
Dept: GERIATRIC MEDICINE | Age: 80
End: 2020-12-18

## 2020-12-18 DIAGNOSIS — G31.84 MILD COGNITIVE IMPAIRMENT: ICD-10-CM

## 2020-12-18 DIAGNOSIS — R07.89 CHEST PRESSURE: ICD-10-CM

## 2020-12-18 DIAGNOSIS — F32.A DEPRESSION, UNSPECIFIED DEPRESSION TYPE: Primary | ICD-10-CM

## 2020-12-18 DIAGNOSIS — G47.30 SLEEP APNEA, UNSPECIFIED TYPE: ICD-10-CM

## 2020-12-18 PROCEDURE — 99213 OFFICE O/P EST LOW 20 MIN: CPT | Performed by: NURSE PRACTITIONER

## 2020-12-18 RX ORDER — ESCITALOPRAM OXALATE 5 MG/1
5 TABLET ORAL DAILY
Qty: 90 TABLET | Refills: 3 | Status: SHIPPED | OUTPATIENT
Start: 2020-12-18 | End: 2021-12-18

## 2020-12-23 ENCOUNTER — HOSPITAL ENCOUNTER (OUTPATIENT)
Dept: NUCLEAR MEDICINE | Age: 80
Discharge: HOME | End: 2020-12-23
Attending: INTERNAL MEDICINE

## 2020-12-23 ENCOUNTER — HOSPITAL ENCOUNTER (OUTPATIENT)
Dept: CV DIAGNOSTICS | Age: 80
Discharge: HOME | End: 2020-12-23
Attending: INTERNAL MEDICINE

## 2020-12-23 DIAGNOSIS — R07.89 CHEST PRESSURE: ICD-10-CM

## 2020-12-23 DIAGNOSIS — I25.10 CORONARY ARTERY DISEASE, ANGINA PRESENCE UNSPECIFIED, UNSPECIFIED VESSEL OR LESION TYPE, UNSPECIFIED WHETHER NATIVE OR TRANSPLANTED HEART: ICD-10-CM

## 2020-12-23 PROCEDURE — 78452 HT MUSCLE IMAGE SPECT MULT: CPT

## 2020-12-23 PROCEDURE — 93306 TTE W/DOPPLER COMPLETE: CPT | Performed by: INTERNAL MEDICINE

## 2020-12-23 PROCEDURE — A9502 TC99M TETROFOSMIN: HCPCS

## 2020-12-23 PROCEDURE — 93017 CV STRESS TEST TRACING ONLY: CPT

## 2020-12-23 PROCEDURE — 93356 MYOCRD STRAIN IMG SPCKL TRCK: CPT

## 2020-12-23 PROCEDURE — 93356 MYOCRD STRAIN IMG SPCKL TRCK: CPT | Performed by: INTERNAL MEDICINE

## 2020-12-23 PROCEDURE — 10002800 HB RX 250 W HCPCS: Performed by: INTERNAL MEDICINE

## 2020-12-23 PROCEDURE — 78452 HT MUSCLE IMAGE SPECT MULT: CPT | Performed by: INTERNAL MEDICINE

## 2020-12-23 RX ORDER — REGADENOSON 0.08 MG/ML
0.4 INJECTION, SOLUTION INTRAVENOUS ONCE
Status: COMPLETED | OUTPATIENT
Start: 2020-12-23 | End: 2020-12-23

## 2020-12-23 RX ADMIN — REGADENOSON 0.4 MG: 0.08 INJECTION, SOLUTION INTRAVENOUS at 12:00

## 2020-12-24 ENCOUNTER — APPOINTMENT (OUTPATIENT)
Dept: NUCLEAR MEDICINE | Age: 80
End: 2020-12-24
Attending: INTERNAL MEDICINE

## 2020-12-24 LAB
AORTIC VALVE AREA: 2.2 CM2
ASCENDING AORTA (AAD): 2.6 CM
AV MEAN GRADIENT (AVMG): 8.1 MMHG
AV PEAK GRADIENT (AVPG): 17.5 MMHG
AV PEAK VELOCITY (AVPV): 2.1 M/S
DOP CALC LVOT PEAK VEL (LVOTPV): 1.1 M/S
E WAVE DECELARATION TIME (MDT): 236.6 MS
EST RIGHT VENT SYSTOLIC PRESSURE BY TRICUSPID REGURGITATION JET (RVSP): 25.2 MMHG
INTERVENTRICULAR SEPTUM IN END DIASTOLE (IVSD): 1.5 CM
LEFT INTERNAL DIMENSION IN SYSTOLE (LVSD): 1.9 CM
LEFT VENTRICULAR INTERNAL DIMENSION IN DIASTOLE (LVDD): 3.2 CM
LEFT VENTRICULAR POSTERIOR WALL IN END DIASTOLE (LVPW): 1.3 CM
LV EF: 72 %
LV END SYSTOLIC LONGITUDINAL STRAIN GLOBAL (LVGS): -20.2 %
LVOT VTI (LVOTVTI): 18 CM
MV E TISSUE VEL LAT (MELV): 8.2 CM/S
MV E TISSUE VEL MED (MESV): 4.9 CM/S
MV E WAVE VEL/E TISSUE VEL MED(MSR): 14.5
MV PEAK A VELOCITY (MVPAV): 0.9 M/S
MV PEAK E VELOCITY (MVPEV): 0.7 M/S
RV TISSUE DOPPLER FREE WALL HEART RATE (RVSTV): 0.1 M/S
SINUSES OF VALSALVA (SVD): 2.5 CM
TRICUSPID VALVE PEAK REGURGITATION VELOCITY (TRPV): 2.4 M/S
TV ESTIMATED RIGHT ARTERIAL PRESSURE (RAP): 3 MMHG

## 2020-12-27 ASSESSMENT — ENCOUNTER SYMPTOMS
HEADACHES: 0
AGITATION: 0
LIGHT-HEADEDNESS: 0
ACTIVITY CHANGE: 1
SEIZURES: 0
SLEEP DISTURBANCE: 0
HALLUCINATIONS: 0
NUMBNESS: 0
SPEECH DIFFICULTY: 0
APPETITE CHANGE: 0
TREMORS: 0
FACIAL ASYMMETRY: 0
NERVOUS/ANXIOUS: 0
UNEXPECTED WEIGHT CHANGE: 0
DIZZINESS: 0
SHORTNESS OF BREATH: 1
ABDOMINAL PAIN: 0
FEVER: 0
CONFUSION: 0
WEAKNESS: 0

## 2021-01-08 ENCOUNTER — V-VISIT (OUTPATIENT)
Dept: GERIATRIC MEDICINE | Age: 81
End: 2021-01-08

## 2021-01-08 DIAGNOSIS — M25.562 CHRONIC PAIN OF BOTH KNEES: Primary | ICD-10-CM

## 2021-01-08 DIAGNOSIS — M25.561 CHRONIC PAIN OF BOTH KNEES: Primary | ICD-10-CM

## 2021-01-08 DIAGNOSIS — G31.84 MILD COGNITIVE IMPAIRMENT: ICD-10-CM

## 2021-01-08 DIAGNOSIS — J30.2 SEASONAL ALLERGIES: ICD-10-CM

## 2021-01-08 DIAGNOSIS — G89.29 CHRONIC PAIN OF BOTH KNEES: Primary | ICD-10-CM

## 2021-01-08 DIAGNOSIS — F32.A DEPRESSION, UNSPECIFIED DEPRESSION TYPE: ICD-10-CM

## 2021-01-08 PROCEDURE — 99213 OFFICE O/P EST LOW 20 MIN: CPT | Performed by: NURSE PRACTITIONER

## 2021-01-08 RX ORDER — AZELASTINE 1 MG/ML
1 SPRAY, METERED NASAL 2 TIMES DAILY
Qty: 3 BOTTLE | Refills: 3 | Status: SHIPPED | OUTPATIENT
Start: 2021-01-08

## 2021-01-08 RX ORDER — DONEPEZIL HYDROCHLORIDE 10 MG/1
10 TABLET, FILM COATED ORAL DAILY
Qty: 90 TABLET | Refills: 3 | Status: SHIPPED | OUTPATIENT
Start: 2021-01-08 | End: 2022-01-08

## 2021-01-08 RX ORDER — MELOXICAM 15 MG/1
15 TABLET ORAL DAILY
Qty: 90 TABLET | Refills: 3 | Status: SHIPPED | OUTPATIENT
Start: 2021-01-08 | End: 2022-01-08

## 2021-01-08 RX ORDER — TRAZODONE HYDROCHLORIDE 50 MG/1
25 TABLET ORAL NIGHTLY
Qty: 45 TABLET | Refills: 3 | Status: SHIPPED | OUTPATIENT
Start: 2021-01-08 | End: 2022-01-08

## 2021-01-08 RX ORDER — FLUTICASONE PROPIONATE 50 MCG
1 SPRAY, SUSPENSION (ML) NASAL DAILY
Qty: 3 BOTTLE | Refills: 3 | Status: SHIPPED | OUTPATIENT
Start: 2021-01-08 | End: 2022-01-08

## 2021-01-08 RX ORDER — ATORVASTATIN CALCIUM 10 MG/1
10 TABLET, FILM COATED ORAL DAILY
Qty: 90 TABLET | Refills: 3 | Status: SHIPPED | OUTPATIENT
Start: 2021-01-08 | End: 2022-01-08

## 2021-01-08 RX ORDER — ALENDRONATE SODIUM 70 MG/1
70 TABLET ORAL
Qty: 12 TABLET | Refills: 3 | Status: SHIPPED | OUTPATIENT
Start: 2021-01-08 | End: 2022-01-08

## 2021-01-12 ENCOUNTER — TELEPHONE (OUTPATIENT)
Dept: GERIATRIC MEDICINE | Age: 81
End: 2021-01-12

## 2021-02-01 ENCOUNTER — E-ADVICE (OUTPATIENT)
Dept: GERIATRIC MEDICINE | Age: 81
End: 2021-02-01

## 2021-02-18 ENCOUNTER — E-ADVICE (OUTPATIENT)
Dept: GERIATRIC MEDICINE | Age: 81
End: 2021-02-18

## 2021-03-10 DIAGNOSIS — Z23 NEED FOR VACCINATION: ICD-10-CM

## 2021-06-30 ENCOUNTER — TELEPHONE (OUTPATIENT)
Dept: HOME HEALTH SERVICES | Age: 81
End: 2021-06-30